# Patient Record
Sex: FEMALE | Race: WHITE | NOT HISPANIC OR LATINO | Employment: FULL TIME | URBAN - METROPOLITAN AREA
[De-identification: names, ages, dates, MRNs, and addresses within clinical notes are randomized per-mention and may not be internally consistent; named-entity substitution may affect disease eponyms.]

---

## 2017-03-29 ENCOUNTER — OFFICE VISIT (OUTPATIENT)
Dept: URGENT CARE | Facility: PHYSICIAN GROUP | Age: 37
End: 2017-03-29
Payer: COMMERCIAL

## 2017-03-29 VITALS
HEIGHT: 62 IN | BODY MASS INDEX: 31.65 KG/M2 | RESPIRATION RATE: 16 BRPM | TEMPERATURE: 98.4 F | HEART RATE: 91 BPM | WEIGHT: 172 LBS | DIASTOLIC BLOOD PRESSURE: 86 MMHG | OXYGEN SATURATION: 95 % | SYSTOLIC BLOOD PRESSURE: 124 MMHG

## 2017-03-29 DIAGNOSIS — J01.91 ACUTE RECURRENT SINUSITIS, UNSPECIFIED LOCATION: ICD-10-CM

## 2017-03-29 DIAGNOSIS — R59.0 LYMPHADENOPATHY OF RIGHT CERVICAL REGION: ICD-10-CM

## 2017-03-29 PROCEDURE — 99214 OFFICE O/P EST MOD 30 MIN: CPT | Performed by: FAMILY MEDICINE

## 2017-03-29 RX ORDER — LEVOFLOXACIN 500 MG/1
500 TABLET, FILM COATED ORAL DAILY
Qty: 10 TAB | Refills: 0 | Status: SHIPPED | OUTPATIENT
Start: 2017-03-29 | End: 2017-04-08

## 2017-03-29 NOTE — PROGRESS NOTES
HPI: Sarah Orozco is a 36 y.o. female who presents with   Chief Complaint   Patient presents with   • Cough     R. ear pain, nasal and chest congestion, headache, swollen glands, sore throat X 4 months       Patient presents to urgent care with continued frontal sinus region headaches nasal congestion postnasal drip right ear pain that has been worsened over the past several days. She's had a sore throat she states that she's had these symptoms on and off for the past 4 months has been treated 2 times with antibiotics but feels that the symptoms have not completely resolved. She has seen an ENT several years ago who had told her she is very narrow sinus passages. She states in the past she has used some Flonase with some improvement in symptoms but not recently has she tried this. She did try to do saline nasal rinse yesterday and feels that she has developed some ear pain has worsened Denies any significant cough or shortness of breath. Patient does have a history of asthma no nausea vomiting or diarrhea. She has had some chills and subjective fevers  Worsened by: activity, laying supine at night, first thing in the morning, when exposed to outside allergens  Improved by: OTC symptomatic medictions      PMH:  has a past medical history of Asthma.  MEDS:   Current outpatient prescriptions:   •  Ibuprofen (ADVIL PO), Take  by mouth., Disp: , Rfl:   •  albuterol 108 (90 BASE) MCG/ACT Aero Soln inhalation aerosol, Inhale 2 Puffs by mouth every 6 hours as needed for Shortness of Breath., Disp: , Rfl:   •  Hydrocod Polst-CPM Polst ER (TUSSIONEX) 10-8 MG/5ML Suspension Extended Release, Take 5 mL by mouth every 12 hours., Disp: 140 mL, Rfl: 0  •  azithromycin (ZITHROMAX) 250 MG Tab, Take as directed, Disp: 6 Tab, Rfl: 0  ALLERGIES: No Known Allergies  SURGHX: No past surgical history on file.  SOCHX:  reports that she has never smoked. She has never used smokeless tobacco.  FH: Family history was reviewed, no  "pertinent findings to report    PE:  Vitals /86 mmHg  Pulse 91  Temp(Src) 36.9 °C (98.4 °F)  Resp 16  Ht 1.575 m (5' 2\")  Wt 78.019 kg (172 lb)  BMI 31.45 kg/m2  SpO2 95%   Gen AOx4, NAD  HEENT: moist mucus membranes,  pain and pressure with percussion of bilateral maxillary sinuses.  Bilateral conjunciva clear without erythema or exudate,  Right TM with mild fluid, left TM  clear without bulge, fluid or loss of landmarks, mild pharyngeal erythema without tonsillar exudate but with bilateral tonsillar enlargement present  Neck: supple, right anterior cervical lymphadenopathy is present, no signs of menigismus  CV/PULM: RRR no murmurs, no rales ronchi or wheezes, no signs of resp distress  Abd soft nontender, bs present  Skin no rashes  Extremities -c/c/e  Neuro appropriate affect,       A/P  1. Acute recurrent sinusitis, unspecified location  levofloxacin (LEVAQUIN) 500 MG tablet    REFERRAL TO ENT   2. Lymphadenopathy of right cervical region       Follow-up with primary care provider within 4-5 days, emergency room precautions discussed.  Patient and/or family appears understanding of information.  Recommend to use flonase daily for 3wks (denies h/o epistaxis)    "

## 2017-03-29 NOTE — MR AVS SNAPSHOT
"        Sarah Mi Ernesto   3/29/2017 8:40 AM   Office Visit   MRN: [id number]    Department:  [facility]   Dept Phone:  [phone]    Description:  Female : 1980   Provider:  Jannette Pisano D.O.           Reason for Visit     Cough R. ear pain, nasal and chest congestion, headache, swollen glands, sore throat X 4 months       Allergies as of 3/29/2017     No Known Allergies      Vital Signs     Blood Pressure Pulse Temperature Respirations Height Weight    124/86 mmHg 91 36.9 °C (98.4 °F) 16 1.575 m (5' 2\") 78.019 kg (172 lb)    Body Mass Index Oxygen Saturation Smoking Status             31.45 kg/m2 95% Never Smoker          Basic Information     Date Of Birth Sex Race Ethnicity Preferred Language    1980 Female White Unknown English      Health Maintenance        Date Due Completion Dates    IMM DTaP/Tdap/Td Vaccine (1 - Tdap) 2000 ---    PAP SMEAR 2002 ---    IMM INFLUENZA (1) 2016 10/20/2015            Current Immunizations     Influenza Vaccine Quad Inj (Pf) 10/20/2015  6:38 AM      Below and/or attached are the medications your provider expects you to take. Review all of your home medications and newly ordered medications with your provider and/or pharmacist. Follow medication instructions as directed by your provider and/or pharmacist. Please keep your medication list with you and share with your provider. Update the information when medications are discontinued, doses are changed, or new medications (including over-the-counter products) are added; and carry medication information at all times in the event of emergency situations     Allergies:  No Known Allergies          Medications  Valid as of: 2017 -  9:01 AM    Generic Name Brand Name Tablet Size Instructions for use    Albuterol Sulfate (Aero Soln) albuterol 108 (90 BASE) MCG/ACT Inhale 2 Puffs by mouth every 6 hours as needed for Shortness of Breath.        Azithromycin (Tab) ZITHROMAX 250 MG Take as " directed        Hydrocod Polst-Chlorphen Polst (Suspension Extended Release) TUSSIONEX 10-8 MG/5ML Take 5 mL by mouth every 12 hours.        Ibuprofen   Take  by mouth.        .                 Medicines prescribed today were sent to:     [facility]/PHARMACY #[id number] [address] [address]    Phone: [phone] Fax: [phone]    Open 24 Hours?: No      Medication refill instructions:       If your prescription bottle indicates you have medication refills left, it is not necessary to call your provider’s office. Please contact your pharmacy and they will refill your medication.    If your prescription bottle indicates you do not have any refills left, you may request refills at any time through one of the following ways: The online [employer] system (except Urgent Care), by calling your provider’s office, or by asking your pharmacy to contact your provider’s office with a refill request. Medication refills are processed only during regular business hours and may not be available until the next business day. Your provider may request additional information or to have a follow-up visit with you prior to refilling your medication.   *Please Note: Medication refills are assigned a new Rx number when refilled electronically. Your pharmacy may indicate that no refills were authorized even though a new prescription for the same medication is available at the pharmacy. Please request the medicine by name with the pharmacy before contacting your provider for a refill.           [employer] Access Code: Activation code not generated  Current [employer] Status: Active

## 2017-05-29 ENCOUNTER — OFFICE VISIT (OUTPATIENT)
Dept: URGENT CARE | Facility: PHYSICIAN GROUP | Age: 37
End: 2017-05-29
Payer: COMMERCIAL

## 2017-05-29 VITALS
WEIGHT: 168 LBS | DIASTOLIC BLOOD PRESSURE: 72 MMHG | OXYGEN SATURATION: 96 % | SYSTOLIC BLOOD PRESSURE: 118 MMHG | HEART RATE: 91 BPM | TEMPERATURE: 97.4 F | RESPIRATION RATE: 16 BRPM | BODY MASS INDEX: 30.91 KG/M2 | HEIGHT: 62 IN

## 2017-05-29 DIAGNOSIS — L03.311 CELLULITIS OF ABDOMINAL WALL: ICD-10-CM

## 2017-05-29 DIAGNOSIS — B37.2 CANDIDAL DERMATITIS: ICD-10-CM

## 2017-05-29 PROCEDURE — 99214 OFFICE O/P EST MOD 30 MIN: CPT | Performed by: NURSE PRACTITIONER

## 2017-05-29 RX ORDER — FLUCONAZOLE 150 MG/1
150 TABLET ORAL DAILY
Qty: 2 TAB | Refills: 0 | Status: SHIPPED | OUTPATIENT
Start: 2017-05-29 | End: 2019-08-28

## 2017-05-29 RX ORDER — DOXYCYCLINE HYCLATE 100 MG
100 TABLET ORAL 2 TIMES DAILY
Qty: 14 TAB | Refills: 0 | Status: SHIPPED | OUTPATIENT
Start: 2017-05-29 | End: 2017-06-05

## 2017-05-29 ASSESSMENT — ENCOUNTER SYMPTOMS: FEVER: 0

## 2017-05-29 NOTE — PROGRESS NOTES
"Subjective:      Sarah Orozco is a 37 y.o. female who presents with Rash            Rash  This is a new problem. Episode onset: one week ago noticed small bumps over her trunk, then one area of bumps became larger, red and hard. Today it is really painful. The problem has been rapidly worsening since onset. The affected locations include the abdomen. The rash is characterized by redness, pain and swelling. Associated with: she is fostering kittens and is not sure if this is because they possibly scratched her and it got infected. Pertinent negatives include no fever. (She also has a significant yeast infection under both her breasts) Treatments tried: warm compresses. The treatment provided no relief.       Review of Systems   Constitutional: Negative for fever.   Skin: Positive for rash.   All other systems reviewed and are negative.    Past Medical History   Diagnosis Date   • Asthma     No past surgical history on file.   Social History     Social History   • Marital Status: Unknown     Spouse Name: N/A   • Number of Children: N/A   • Years of Education: N/A     Occupational History   • Not on file.     Social History Main Topics   • Smoking status: Never Smoker    • Smokeless tobacco: Never Used   • Alcohol Use: Not on file   • Drug Use: Not on file   • Sexual Activity: Not on file     Other Topics Concern   • Not on file     Social History Narrative          Objective:     /72 mmHg  Pulse 91  Temp(Src) 36.3 °C (97.4 °F)  Resp 16  Ht 1.575 m (5' 2\")  Wt 76.204 kg (168 lb)  BMI 30.72 kg/m2  SpO2 96%     Physical Exam   Constitutional: She is oriented to person, place, and time. Vital signs are normal. She appears well-developed and well-nourished.   HENT:   Head: Normocephalic and atraumatic.   Eyes: EOM are normal. Pupils are equal, round, and reactive to light.   Neck: Normal range of motion.   Cardiovascular: Normal rate and regular rhythm.    Pulmonary/Chest: Effort normal. "       Musculoskeletal: Normal range of motion.   Neurological: She is alert and oriented to person, place, and time.   Skin: Skin is warm, dry and intact.        Psychiatric: She has a normal mood and affect. Her speech is normal and behavior is normal. Thought content normal.   Vitals reviewed.              Assessment/Plan:     1. Cellulitis of abdominal wall  - doxycycline (VIBRAMYCIN) 100 MG Tab; Take 1 Tab by mouth 2 times a day for 7 days.  Dispense: 14 Tab; Refill: 0    2. Candidal dermatitis  - miconazole (MICOTIN) 2 % Cream; Apply 1 Application to affected area(s) 2 times a day.  Dispense: 1 Tube; Refill: 0  - fluconazole (DIFLUCAN) 150 MG tablet; Take 1 Tab by mouth every day.  Dispense: 2 Tab; Refill: 0    Warm compresses to area of cellulitis  Apply polysporin daily  Keep skin under breasts clean and dry, free of moisture as much as possible  Instructed to take diflucan when she finishes ABX  Supportive care, differential diagnoses, and indications for immediate follow-up discussed with patient.    Pathogenesis of diagnosis discussed including typical length and natural progression.      Instructed to return to  or nearest emergency department if symptoms fail to improve, for any change in condition, further concerns, or new concerning symptoms.  Patient states understanding of the plan of care and discharge instructions.

## 2017-05-29 NOTE — MR AVS SNAPSHOT
"        Sarah Mi Ernesto   2017 9:05 AM   Office Visit   MRN: [id number]    Department:  [facility]   Dept Phone:  [phone]    Description:  Female : 1980   Provider:  ALYSON Bean           Reason for Visit     Rash Red, swollen and painful rash on abdomen x 3 days.       Allergies as of 2017     No Known Allergies      You were diagnosed with     Cellulitis of abdominal wall   [519536]       Candidal dermatitis   [[id number]]         Vital Signs     Blood Pressure Pulse Temperature Respirations Height Weight    118/72 mmHg 91 36.3 °C (97.4 °F) 16 1.575 m (5' 2\") 76.204 kg (168 lb)    Body Mass Index Oxygen Saturation Smoking Status             30.72 kg/m2 96% Never Smoker          Basic Information     Date Of Birth Sex Race Ethnicity Preferred Language    1980 Female White Unknown English      Health Maintenance        Date Due Completion Dates    IMM DTaP/Tdap/Td Vaccine (1 - Tdap) 2000 ---    PAP SMEAR 2002 ---            Current Immunizations     Influenza Vaccine Quad Inj (Pf) 10/20/2015  6:38 AM      Below and/or attached are the medications your provider expects you to take. Review all of your home medications and newly ordered medications with your provider and/or pharmacist. Follow medication instructions as directed by your provider and/or pharmacist. Please keep your medication list with you and share with your provider. Update the information when medications are discontinued, doses are changed, or new medications (including over-the-counter products) are added; and carry medication information at all times in the event of emergency situations     Allergies:  No Known Allergies          Medications  Valid as of: May 29, 2017 - 10:19 AM    Generic Name Brand Name Tablet Size Instructions for use    Albuterol Sulfate (Aero Soln) albuterol 108 (90 BASE) MCG/ACT Inhale 2 Puffs by mouth every 6 hours as needed for Shortness of Breath.       " Azithromycin (Tab) ZITHROMAX 250 MG Take as directed        Doxycycline Hyclate (Tab) VIBRAMYCIN 100 MG Take 1 Tab by mouth 2 times a day for 7 days.        Fluconazole (Tab) DIFLUCAN 150 MG Take 1 Tab by mouth every day.        Fluticasone Propionate   Spray  in nose.        Hydrocod Polst-Chlorphen Polst (Suspension Extended Release) TUSSIONEX 10-8 MG/5ML Take 5 mL by mouth every 12 hours.        Ibuprofen   Take  by mouth.        Loratadine   Take  by mouth.        Miconazole Nitrate (Cream) MICOTIN 2 % Apply 1 Application to affected area(s) 2 times a day.        .                 Medicines prescribed today were sent to:     [facility]/PHARMACY #[id number] - [place], [place] - [address]    [address] [place] [address]    Phone: [phone] Fax: [phone]    Open 24 Hours?: No      Medication refill instructions:       If your prescription bottle indicates you have medication refills left, it is not necessary to call your provider’s office. Please contact your pharmacy and they will refill your medication.    If your prescription bottle indicates you do not have any refills left, you may request refills at any time through one of the following ways: The online [employer] system (except Urgent Care), by calling your provider’s office, or by asking your pharmacy to contact your provider’s office with a refill request. Medication refills are processed only during regular business hours and may not be available until the next business day. Your provider may request additional information or to have a follow-up visit with you prior to refilling your medication.   *Please Note: Medication refills are assigned a new Rx number when refilled electronically. Your pharmacy may indicate that no refills were authorized even though a new prescription for the same medication is available at the pharmacy. Please request the medicine by name with the pharmacy before contacting your provider for a refill.           [employer] Access Code: Activation  code not generated  Current MyChart Status: Active

## 2018-01-14 ENCOUNTER — OFFICE VISIT (OUTPATIENT)
Dept: URGENT CARE | Facility: PHYSICIAN GROUP | Age: 38
End: 2018-01-14
Payer: COMMERCIAL

## 2018-01-14 VITALS
HEIGHT: 62 IN | DIASTOLIC BLOOD PRESSURE: 70 MMHG | HEART RATE: 104 BPM | SYSTOLIC BLOOD PRESSURE: 130 MMHG | OXYGEN SATURATION: 95 % | WEIGHT: 180.2 LBS | TEMPERATURE: 98.3 F | BODY MASS INDEX: 33.16 KG/M2

## 2018-01-14 DIAGNOSIS — L03.115 CELLULITIS OF RIGHT LOWER EXTREMITY: ICD-10-CM

## 2018-01-14 PROCEDURE — 99214 OFFICE O/P EST MOD 30 MIN: CPT | Performed by: NURSE PRACTITIONER

## 2018-01-14 RX ORDER — CLINDAMYCIN HYDROCHLORIDE 300 MG/1
300 CAPSULE ORAL 3 TIMES DAILY
Qty: 21 CAP | Refills: 0 | Status: SHIPPED | OUTPATIENT
Start: 2018-01-14 | End: 2018-01-21

## 2018-01-14 ASSESSMENT — PAIN SCALES - GENERAL: PAINLEVEL: 8=MODERATE-SEVERE PAIN

## 2018-01-14 NOTE — PROGRESS NOTES
"Subjective:      Sarah Orozco is a 37 y.o. female who presents with Leg Problem (R leg Swollen/bleeding, x4 days )            This is a new problem. Pt reports she has been in [place] for one week on vacation. Four days ago, during vacation, she noticed a painful lump develop on the inside of her right thigh. Over the past few days the pain, swelling and redness have become much worse. It is almost too painful to walk at this point. She admits the bump bleeds sometimes but denies any other color of fluid. She has tried warm compresses and antibiotic ointment w/o relief.        Review of Systems   Musculoskeletal:        Infection right thigh   All other systems reviewed and are negative.    Past Medical History:   Diagnosis Date   • Asthma     History reviewed. No pertinent surgical history.   Social History     Social History   • Marital status: Unknown     Spouse name: N/A   • Number of children: N/A   • Years of education: N/A     Occupational History   • Not on file.     Social History Main Topics   • Smoking status: Never Smoker   • Smokeless tobacco: Never Used   • Alcohol use No   • Drug use: No   • Sexual activity: Not on file     Other Topics Concern   • Not on file     Social History Narrative   • No narrative on file          Objective:     /70   Pulse (!) 104   Temp 36.8 °C (98.3 °F)   Ht 1.575 m (5' 2\")   Wt 81.7 kg (180 lb 3.2 oz)   SpO2 95%   BMI 32.96 kg/m²      Physical Exam   Constitutional: She is oriented to person, place, and time. Vital signs are normal. She appears well-developed and well-nourished.   HENT:   Head: Normocephalic and atraumatic.   Eyes: EOM are normal. Pupils are equal, round, and reactive to light.   Neck: Normal range of motion.   Cardiovascular: Normal rate and regular rhythm.    Pulmonary/Chest: Effort normal.   Musculoskeletal: Normal range of motion.        Right upper leg: She exhibits tenderness.        Legs:  Neurological: She is alert and oriented to " person, place, and time.   Skin: Skin is warm and dry. Capillary refill takes less than 2 seconds.   Psychiatric: She has a normal mood and affect. Her speech is normal and behavior is normal. Thought content normal.   Vitals reviewed.              Assessment/Plan:     1. Cellulitis of right lower extremity  - clindamycin (CLEOCIN) 300 MG Cap; Take 1 Cap by mouth 3 times a day for 7 days.  Dispense: 21 Cap; Refill: 0    Origin of abscess discussed with patient includes but is not limited to insect bite, ingrown hair follicle, sebaceous cyst  May continue warm compresses as needed  No need to apply antibiotic ointment  Tylenol and Ibuprofen as needed for pain  RTC in 3-4 days if area becomes localized and needs to be drained or if she does not see much improvement at all  Strict ER precautions for development of fever, body aches, or worsening redness and swelling  Supportive care, differential diagnoses, and indications for immediate follow-up discussed with patient.    Pathogenesis of diagnosis discussed including typical length and natural progression.      Instructed to return to  or nearest emergency department if symptoms fail to improve, for any change in condition, further concerns, or new concerning symptoms.  Patient states understanding of the plan of care and discharge instructions.

## 2018-06-01 ENCOUNTER — HOSPITAL ENCOUNTER (OUTPATIENT)
Dept: LAB | Facility: MEDICAL CENTER | Age: 38
End: 2018-06-01
Attending: FAMILY MEDICINE
Payer: COMMERCIAL

## 2018-06-01 LAB
25(OH)D3 SERPL-MCNC: 26 NG/ML (ref 30–100)
ALBUMIN SERPL BCP-MCNC: 4.1 G/DL (ref 3.2–4.9)
ALBUMIN/GLOB SERPL: 1.4 G/DL
ALP SERPL-CCNC: 38 U/L (ref 30–99)
ALT SERPL-CCNC: 12 U/L (ref 2–50)
ANION GAP SERPL CALC-SCNC: 6 MMOL/L (ref 0–11.9)
APPEARANCE UR: ABNORMAL
AST SERPL-CCNC: 16 U/L (ref 12–45)
BACTERIA #/AREA URNS HPF: NEGATIVE /HPF
BASOPHILS # BLD AUTO: 0.7 % (ref 0–1.8)
BASOPHILS # BLD: 0.03 K/UL (ref 0–0.12)
BILIRUB SERPL-MCNC: 0.5 MG/DL (ref 0.1–1.5)
BILIRUB UR QL STRIP.AUTO: NEGATIVE
BUN SERPL-MCNC: 10 MG/DL (ref 8–22)
CALCIUM SERPL-MCNC: 8.7 MG/DL (ref 8.5–10.5)
CHLORIDE SERPL-SCNC: 105 MMOL/L (ref 96–112)
CHOLEST SERPL-MCNC: 134 MG/DL (ref 100–199)
CO2 SERPL-SCNC: 24 MMOL/L (ref 20–33)
COLOR UR: YELLOW
CREAT SERPL-MCNC: 0.71 MG/DL (ref 0.5–1.4)
EOSINOPHIL # BLD AUTO: 0.07 K/UL (ref 0–0.51)
EOSINOPHIL NFR BLD: 1.5 % (ref 0–6.9)
EPI CELLS #/AREA URNS HPF: ABNORMAL /HPF
ERYTHROCYTE [DISTWIDTH] IN BLOOD BY AUTOMATED COUNT: 40.2 FL (ref 35.9–50)
EST. AVERAGE GLUCOSE BLD GHB EST-MCNC: 103 MG/DL
FERRITIN SERPL-MCNC: 95.9 NG/ML (ref 10–291)
FOLATE SERPL-MCNC: 19 NG/ML
GLOBULIN SER CALC-MCNC: 3 G/DL (ref 1.9–3.5)
GLUCOSE SERPL-MCNC: 89 MG/DL (ref 65–99)
GLUCOSE UR STRIP.AUTO-MCNC: NEGATIVE MG/DL
HBA1C MFR BLD: 5.2 % (ref 0–5.6)
HCT VFR BLD AUTO: 42 % (ref 37–47)
HDLC SERPL-MCNC: 50 MG/DL
HGB BLD-MCNC: 14.3 G/DL (ref 12–16)
HYALINE CASTS #/AREA URNS LPF: ABNORMAL /LPF
IMM GRANULOCYTES # BLD AUTO: 0.01 K/UL (ref 0–0.11)
IMM GRANULOCYTES NFR BLD AUTO: 0.2 % (ref 0–0.9)
IRON SATN MFR SERPL: 39 % (ref 15–55)
IRON SERPL-MCNC: 151 UG/DL (ref 40–170)
KETONES UR STRIP.AUTO-MCNC: NEGATIVE MG/DL
LDLC SERPL CALC-MCNC: 64 MG/DL
LEUKOCYTE ESTERASE UR QL STRIP.AUTO: ABNORMAL
LYMPHOCYTES # BLD AUTO: 1.73 K/UL (ref 1–4.8)
LYMPHOCYTES NFR BLD: 38 % (ref 22–41)
MCH RBC QN AUTO: 30.6 PG (ref 27–33)
MCHC RBC AUTO-ENTMCNC: 34 G/DL (ref 33.6–35)
MCV RBC AUTO: 89.9 FL (ref 81.4–97.8)
MICRO URNS: ABNORMAL
MONOCYTES # BLD AUTO: 0.32 K/UL (ref 0–0.85)
MONOCYTES NFR BLD AUTO: 7 % (ref 0–13.4)
NEUTROPHILS # BLD AUTO: 2.39 K/UL (ref 2–7.15)
NEUTROPHILS NFR BLD: 52.6 % (ref 44–72)
NITRITE UR QL STRIP.AUTO: NEGATIVE
NRBC # BLD AUTO: 0 K/UL
NRBC BLD-RTO: 0 /100 WBC
PH UR STRIP.AUTO: 6 [PH]
PLATELET # BLD AUTO: 287 K/UL (ref 164–446)
PMV BLD AUTO: 10.7 FL (ref 9–12.9)
POTASSIUM SERPL-SCNC: 4.3 MMOL/L (ref 3.6–5.5)
PROT SERPL-MCNC: 7.1 G/DL (ref 6–8.2)
PROT UR QL STRIP: 30 MG/DL
RBC # BLD AUTO: 4.67 M/UL (ref 4.2–5.4)
RBC # URNS HPF: >150 /HPF
RBC UR QL AUTO: ABNORMAL
SODIUM SERPL-SCNC: 135 MMOL/L (ref 135–145)
SP GR UR STRIP.AUTO: 1.02
TIBC SERPL-MCNC: 389 UG/DL (ref 250–450)
TRIGL SERPL-MCNC: 101 MG/DL (ref 0–149)
TSH SERPL DL<=0.005 MIU/L-ACNC: 0.87 UIU/ML (ref 0.38–5.33)
UROBILINOGEN UR STRIP.AUTO-MCNC: 0.2 MG/DL
VIT B12 SERPL-MCNC: 317 PG/ML (ref 211–911)
WBC # BLD AUTO: 4.6 K/UL (ref 4.8–10.8)
WBC #/AREA URNS HPF: ABNORMAL /HPF

## 2018-06-01 PROCEDURE — 82728 ASSAY OF FERRITIN: CPT

## 2018-06-01 PROCEDURE — 80061 LIPID PANEL: CPT

## 2018-06-01 PROCEDURE — 83540 ASSAY OF IRON: CPT

## 2018-06-01 PROCEDURE — 80053 COMPREHEN METABOLIC PANEL: CPT

## 2018-06-01 PROCEDURE — 83036 HEMOGLOBIN GLYCOSYLATED A1C: CPT

## 2018-06-01 PROCEDURE — 82746 ASSAY OF FOLIC ACID SERUM: CPT

## 2018-06-01 PROCEDURE — 85025 COMPLETE CBC W/AUTO DIFF WBC: CPT

## 2018-06-01 PROCEDURE — 87086 URINE CULTURE/COLONY COUNT: CPT

## 2018-06-01 PROCEDURE — 83550 IRON BINDING TEST: CPT

## 2018-06-01 PROCEDURE — 82607 VITAMIN B-12: CPT

## 2018-06-01 PROCEDURE — 84443 ASSAY THYROID STIM HORMONE: CPT

## 2018-06-01 PROCEDURE — 81001 URINALYSIS AUTO W/SCOPE: CPT

## 2018-06-01 PROCEDURE — 82306 VITAMIN D 25 HYDROXY: CPT

## 2018-06-01 PROCEDURE — 36415 COLL VENOUS BLD VENIPUNCTURE: CPT

## 2018-06-03 LAB
BACTERIA UR CULT: NORMAL
SIGNIFICANT IND 70042: NORMAL
SITE SITE: NORMAL
SOURCE SOURCE: NORMAL

## 2018-06-27 ENCOUNTER — HOSPITAL ENCOUNTER (OUTPATIENT)
Dept: LAB | Facility: MEDICAL CENTER | Age: 38
End: 2018-06-27
Attending: FAMILY MEDICINE
Payer: COMMERCIAL

## 2018-06-27 PROCEDURE — 81001 URINALYSIS AUTO W/SCOPE: CPT

## 2018-06-28 LAB
APPEARANCE UR: ABNORMAL
BACTERIA #/AREA URNS HPF: ABNORMAL /HPF
BILIRUB UR QL STRIP.AUTO: NEGATIVE
COLOR UR: YELLOW
EPI CELLS #/AREA URNS HPF: ABNORMAL /HPF
GLUCOSE UR STRIP.AUTO-MCNC: NEGATIVE MG/DL
KETONES UR STRIP.AUTO-MCNC: NEGATIVE MG/DL
LEUKOCYTE ESTERASE UR QL STRIP.AUTO: ABNORMAL
MICRO URNS: ABNORMAL
NITRITE UR QL STRIP.AUTO: NEGATIVE
PH UR STRIP.AUTO: 6.5 [PH]
PROT UR QL STRIP: NEGATIVE MG/DL
RBC # URNS HPF: ABNORMAL /HPF
RBC UR QL AUTO: ABNORMAL
SP GR UR STRIP.AUTO: 1.01
UROBILINOGEN UR STRIP.AUTO-MCNC: 0.2 MG/DL
WBC #/AREA URNS HPF: ABNORMAL /HPF

## 2019-05-26 ENCOUNTER — OFFICE VISIT (OUTPATIENT)
Dept: URGENT CARE | Facility: MEDICAL CENTER | Age: 39
End: 2019-05-26
Payer: COMMERCIAL

## 2019-05-26 VITALS
TEMPERATURE: 96.9 F | DIASTOLIC BLOOD PRESSURE: 90 MMHG | OXYGEN SATURATION: 96 % | BODY MASS INDEX: 32.5 KG/M2 | WEIGHT: 176.6 LBS | HEART RATE: 107 BPM | RESPIRATION RATE: 16 BRPM | SYSTOLIC BLOOD PRESSURE: 122 MMHG | HEIGHT: 62 IN

## 2019-05-26 DIAGNOSIS — K64.5 THROMBOSED EXTERNAL HEMORRHOIDS: ICD-10-CM

## 2019-05-26 PROCEDURE — 99999 PR NO CHARGE: CPT | Performed by: PHYSICIAN ASSISTANT

## 2019-05-26 PROCEDURE — 46083 INC THROMBOSED HROID XTRNL: CPT | Performed by: PHYSICIAN ASSISTANT

## 2019-05-26 ASSESSMENT — ENCOUNTER SYMPTOMS
CHANGE IN BOWEL HABIT: 1
ROS GI COMMENTS: EXTERNAL HEMORRHOIDS
FEVER: 0
BLOOD IN STOOL: 0
DIARRHEA: 0
DIZZINESS: 0
HEADACHES: 0
VOMITING: 0
SHORTNESS OF BREATH: 0
PALPITATIONS: 0
NAUSEA: 0
ABDOMINAL PAIN: 0
BRUISES/BLEEDS EASILY: 0
CHILLS: 0
MYALGIAS: 0
CONSTIPATION: 1
SORE THROAT: 0
COUGH: 0

## 2019-05-27 NOTE — PROGRESS NOTES
"Subjective:      Sarah Orozco is a 39 y.o. female who presents with Hemorrhoids (severe pain, since patient had son, it got worse on Thursday, has taken over the counter \"prolax\" but has not worked.)      Hemorrhoids   This is a new (She states that she first got hemorrhoids back in 2009 after the birth of her son she has had them since that time but they never bother her) problem. The current episode started in the past 7 days (She started to notice the becoming painful 3 to 4 days ago). The problem occurs constantly. The problem has been rapidly worsening. Associated symptoms include a change in bowel habit (Patient reports that she has been constipated for the past 2 months she is to few days ago she had a very large bowel movement and that of the hemorrhoid seemed to get worse.). Pertinent negatives include no abdominal pain, chest pain, chills, congestion, coughing, fever, headaches, myalgias, nausea, rash, sore throat, urinary symptoms or vomiting. Exacerbated by: Sitting and having bowel movement make it worse.  She said last night she was even having trouble sleeping. Treatments tried: She is tried Preparation H, coconut oil, sitz bath's. Improvement on treatment: She said the sitz baths initially provided some relief but have not been helping recently.       Review of Systems   Constitutional: Negative for chills and fever.   HENT: Negative for congestion and sore throat.    Respiratory: Negative for cough and shortness of breath.    Cardiovascular: Negative for chest pain and palpitations.   Gastrointestinal: Positive for change in bowel habit (Patient reports that she has been constipated for the past 2 months she is to few days ago she had a very large bowel movement and that of the hemorrhoid seemed to get worse.), constipation and hemorrhoids. Negative for abdominal pain, blood in stool, diarrhea, nausea and vomiting.        External hemorrhoids   Genitourinary: Negative for dysuria. " "  Musculoskeletal: Negative for myalgias.   Skin: Negative for rash.   Neurological: Negative for dizziness and headaches.   Endo/Heme/Allergies: Does not bruise/bleed easily.       PMH:  has a past medical history of Asthma.  MEDS:   Current Outpatient Prescriptions:   •  albuterol 108 (90 BASE) MCG/ACT Aero Soln inhalation aerosol, Inhale 2 Puffs by mouth every 6 hours as needed for Shortness of Breath., Disp: , Rfl:   •  Fluticasone Propionate (FLONASE NA), Spray  in nose., Disp: , Rfl:   •  Loratadine (CLARITIN PO), Take  by mouth., Disp: , Rfl:   •  miconazole (MICOTIN) 2 % Cream, Apply 1 Application to affected area(s) 2 times a day. (Patient not taking: Reported on 5/26/2019), Disp: 1 Tube, Rfl: 0  •  fluconazole (DIFLUCAN) 150 MG tablet, Take 1 Tab by mouth every day. (Patient not taking: Reported on 5/26/2019), Disp: 2 Tab, Rfl: 0  •  Ibuprofen (ADVIL PO), Take  by mouth., Disp: , Rfl:   •  Hydrocod Polst-CPM Polst ER (TUSSIONEX) 10-8 MG/5ML Suspension Extended Release, Take 5 mL by mouth every 12 hours. (Patient not taking: Reported on 5/26/2019), Disp: 140 mL, Rfl: 0  •  azithromycin (ZITHROMAX) 250 MG Tab, Take as directed (Patient not taking: Reported on 5/26/2019), Disp: 6 Tab, Rfl: 0  ALLERGIES: No Known Allergies  SURGHX: No past surgical history on file.  SOCHX:  reports that she has never smoked. She has never used smokeless tobacco. She reports that she does not drink alcohol or use drugs.  FH: Family history was reviewed, no pertinent findings to report     Objective:     /90 (BP Location: Left arm, Patient Position: Sitting, BP Cuff Size: Adult)   Pulse (!) 107   Temp 36.1 °C (96.9 °F) (Temporal)   Resp 16   Ht 1.575 m (5' 2\")   Wt 80.1 kg (176 lb 9.6 oz)   SpO2 96%   BMI 32.30 kg/m²      Physical Exam   Constitutional: She is oriented to person, place, and time. She appears well-developed and well-nourished.   HENT:   Head: Normocephalic and atraumatic.   Right Ear: External ear " normal.   Left Ear: External ear normal.   Eyes: Pupils are equal, round, and reactive to light. Conjunctivae are normal.   Cardiovascular: Normal rate and regular rhythm.    No murmur heard.  Pulmonary/Chest: Effort normal and breath sounds normal.   Abdominal: Soft. Normal appearance. There is tenderness in the suprapubic area. There is no CVA tenderness.   Genitourinary: Rectal exam shows external hemorrhoid (Very large thrombosed external hemorrhoid) and tenderness. Pelvic exam was performed with patient prone.   Neurological: She is alert and oriented to person, place, and time.   Skin: Skin is warm and dry. Capillary refill takes less than 2 seconds.   Psychiatric: She has a normal mood and affect. Her behavior is normal. Judgment normal.        Assessment/Plan:     1. Thrombosed external hemorrhoids  - Incision and Drainage  - REFERRAL TO COLORECTAL SURGERY  - Used an 11 blade scalpel to make a very small incision in the thrombosed hemorrhoid.  Small clot and copious amount of blood was released.  The hemorrhoid shrunk in size by approximately half to three quarters.  Patient tolerated the procedure well.  Afterwards she reported feeling some mild improvement in her pain.  -Advised her to continue doing the sitz bath's multiple times per day and we will have her follow-up with the colorectal surgeon to discuss permanent removal options.  Advised that if it should get worse again prior to her being able to see the surgeon she should go to the emergency room for further evaluation      Differential Diagnosis, natural history, and supportive care discussed. Return to the Urgent Care or follow up with your PCP if symptoms fail to resolve, or for any new or worsening symptoms. Emergency room precautions discussed. Patient and/or family appears understanding of information.

## 2019-08-28 ENCOUNTER — OFFICE VISIT (OUTPATIENT)
Dept: CARDIOLOGY | Facility: MEDICAL CENTER | Age: 39
End: 2019-08-28
Payer: COMMERCIAL

## 2019-08-28 VITALS
OXYGEN SATURATION: 96 % | HEART RATE: 70 BPM | WEIGHT: 171 LBS | SYSTOLIC BLOOD PRESSURE: 118 MMHG | DIASTOLIC BLOOD PRESSURE: 88 MMHG | BODY MASS INDEX: 31.47 KG/M2 | HEIGHT: 62 IN

## 2019-08-28 DIAGNOSIS — R00.2 PALPITATIONS: ICD-10-CM

## 2019-08-28 DIAGNOSIS — Z82.49 FAMILY HISTORY OF HEART DISEASE: ICD-10-CM

## 2019-08-28 PROCEDURE — 99203 OFFICE O/P NEW LOW 30 MIN: CPT | Performed by: INTERNAL MEDICINE

## 2019-08-28 RX ORDER — CITALOPRAM 20 MG/1
20 TABLET ORAL DAILY
COMMUNITY
End: 2021-06-15

## 2019-08-28 RX ORDER — CETIRIZINE HYDROCHLORIDE 10 MG/1
10 TABLET ORAL
COMMUNITY
End: 2021-06-15

## 2019-08-28 NOTE — LETTER
[facility]   [address]   [address]  [place] [address]  Phone: [phone]  Fax: [phone]              Sarah Orozco  1980    Encounter Date: 8/28/2019    Dorothy Pavon M.D.          PROGRESS NOTE:  Subjective:   Chief Complaint:   Chief Complaint   Patient presents with   • Palpitations       Sarah Orozco is a 39 y.o. female who is self-referred for family history premature coronary artery disease and palpitations.    Notes her heart racing. Will feel like her heart is racing quickly, lasts only a few seconds, associated with wave of lightheadedness, can happen sitting/resting, not with activity. Only happens once every few weeks.    She is not limited by chest pain, pressure or tightness.   No significant dyspnea on exertion, orthopnea or lower extremity swelling.   Does note some [place] going upstairs at work carrying boxes.  No presyncope/syncope.   No symptoms of leg claudication.   No stroke/TIA like symptoms.  Prior right thigh turning blue, had testing, resolved, only top of thigh.    Has exercise induced asthma, worse in cold weather.  2 years ago she had elevated BP at a health screening, then PCP and OB said it was high, but it was borderline, then it came back down.  No Tobacco, SLE/RA/CKD.  No DM.  Has a murmur, RSB, not new, heard by OB also.    LDL cholesterol 64, HDL 50.  Father has severely elevated cholesterol.  Father had a few ecgs, then stress test, then CABG at 53, his mother had CABG at 49.    Teaches at [employer].  , has 2 kids, 10 (son) and 5 (girl).    DATA REVIEWED by me:  ECG 8/29/2018  Sinus 69    2 view chest x-ray 2005  Normal    Most recent labs:     6/1/2018 hemoglobin 14.3, platelets 287, sodium 135, potassium 4.3, creatinine 0.  7 1, LFTs normal, LDL 64, HDL 50, to glycerides 101, total cholesterol 134    Past Medical History:   Diagnosis Date   •  Asthma      History reviewed. No pertinent surgical history.  Family History   Problem Relation Age of Onset   • Heart Disease Father         Father had CABG at 53, after abnl stress and ECG   • Hyperlipidemia Father    • Heart Disease Paternal Grandmother         CABG at 49,  in her sleep in 70s   • Other Mother         Liver failure after gallbladder surgery, 61     Social History     Socioeconomic History   • Marital status:      Spouse name: Not on file   • Number of children: Not on file   • Years of education: Not on file   • Highest education level: Not on file   Occupational History   • Not on file   Social Needs   • Financial resource strain: Not on file   • Food insecurity:     Worry: Not on file     Inability: Not on file   • Transportation needs:     Medical: Not on file     Non-medical: Not on file   Tobacco Use   • Smoking status: Never Smoker   • Smokeless tobacco: Never Used   Substance and Sexual Activity   • Alcohol use: No   • Drug use: No   • Sexual activity: Not on file   Lifestyle   • Physical activity:     Days per week: Not on file     Minutes per session: Not on file   • Stress: Not on file   Relationships   • Social connections:     Talks on phone: Not on file     Gets together: Not on file     Attends Faith service: Not on file     Active member of club or organization: Not on file     Attends meetings of clubs or organizations: Not on file     Relationship status: Not on file   • Intimate partner violence:     Fear of current or ex partner: Not on file     Emotionally abused: Not on file     Physically abused: Not on file     Forced sexual activity: Not on file   Other Topics Concern   • Not on file   Social History Narrative   • Not on file     No Known Allergies    Current Outpatient Medications   Medication Sig Dispense Refill   • cetirizine (ZYRTEC) 10 MG Tab Take 10 mg by mouth 1 time daily as needed for Allergies.     • citalopram (CELEXA) 20 MG Tab Take 20 mg by  "mouth every day. Indications: Depression     • Fluticasone Propionate (FLONASE NA) Spray  in nose.     • miconazole (MICOTIN) 2 % Cream Apply 1 Application to affected area(s) 2 times a day. 1 Tube 0   • Ibuprofen (ADVIL PO) Take  by mouth.     • albuterol 108 (90 BASE) MCG/ACT Aero Soln inhalation aerosol Inhale 2 Puffs by mouth every 6 hours as needed for Shortness of Breath.       No current facility-administered medications for this visit.        ROS  All others systems reviewed and negative.     Objective:     /88 (BP Location: Left arm, Patient Position: Sitting, BP Cuff Size: Adult)   Pulse 70   Ht 1.575 m (5' 2\")   Wt 77.6 kg (171 lb)   SpO2 96%  Body mass index is 31.28 kg/m².    Physical Exam   General: No acute distress. Well nourished.  HEENT: EOM grossly intact, no scleral icterus, no pharyngeal erythema.   Neck:  No JVD, no bruits, trachea midline  CVS: RRR. Normal S1, S2. 2/6 early peaking sys murmur RSB. No LE edema.  2+ radial pulses, 2+ PT pulses  Resp: CTAB. No wheezing or crackles/rhonchi. Normal respiratory effort.  Abdomen: Soft, NT, no quentin hepatomegaly.  MSK/Ext: No clubbing or cyanosis.  Skin: Warm and dry, no rashes.  Neurological: CN III-XII grossly intact. No focal deficits.   Psych: A&O x 3, appropriate affect, good judgement      Assessment:     1. Family history of heart disease  EKG   2. Palpitations         Medical Decision Making:  Today's Assessment / Status / Plan:     -Her palpitations are short, not concerning at this time  -Low risk for CAD at her age even with FH  -See below    Written instructions given today:    -At this time, I don't have concerns  -We could at any time have a Zio Patch heart monitor placed if the palpitations/heart racing increase in time, frequency or start to bother you  -At some point consider a Calcium Score to look for premature calcified heart disease to help you know your risk but I don't recommend this until at least your late " 40s.        Return if symptoms worsen or fail to improve.    It is my pleasure to participate in the care of Ms. Orozco.  Please do not hesitate to contact me with questions or concerns.    Dorothy Pavon MD, [facility]  Cardiologist [facility]    Please note that this dictation was created using voice recognition software. I have made every reasonable attempt to correct obvious errors, but it is possible there are errors of grammar and possibly content that I did not discover before finalizing the note.      Kavin Bush D.O.  [address] [facility] [address]  VIA Facsimile: [phone]

## 2019-08-28 NOTE — PATIENT INSTRUCTIONS
-At this time, I don't have concerns  -We could at any time have a Zio Patch heart monitor placed if the palpitations/heart racing increase in time, frequency or start to bother you  -At some point consider a Calcium Score to look for premature calcified heart disease to help you know your risk but I don't recommend this until at least your late 40s.

## 2019-08-28 NOTE — PROGRESS NOTES
Subjective:   Chief Complaint:   Chief Complaint   Patient presents with   • Palpitations       Sarah Orozco is a 39 y.o. female who is self-referred for family history premature coronary artery disease and palpitations.    Notes her heart racing. Will feel like her heart is racing quickly, lasts only a few seconds, associated with wave of lightheadedness, can happen sitting/resting, not with activity. Only happens once every few weeks.    She is not limited by chest pain, pressure or tightness.   No significant dyspnea on exertion, orthopnea or lower extremity swelling.   Does note some [place] going upstairs at work carrying boxes.  No presyncope/syncope.   No symptoms of leg claudication.   No stroke/TIA like symptoms.  Prior right thigh turning blue, had testing, resolved, only top of thigh.    Has exercise induced asthma, worse in cold weather.  2 years ago she had elevated BP at a health screening, then PCP and OB said it was high, but it was borderline, then it came back down.  No Tobacco, SLE/RA/CKD.  No DM.  Has a murmur, RSB, not new, heard by OB also.    LDL cholesterol 64, HDL 50.  Father has severely elevated cholesterol.  Father had a few ecgs, then stress test, then CABG at 53, his mother had CABG at 49.    Teaches at [employer], [employer].  , has 2 kids, 10 (son) and 5 (girl).    DATA REVIEWED by me:  ECG 2018  Sinus 69    2 view chest x-ray   Normal    Most recent labs:     2018 hemoglobin 14.3, platelets 287, sodium 135, potassium 4.3, creatinine 0.  7 1, LFTs normal, LDL 64, HDL 50, to glycerides 101, total cholesterol 134    Past Medical History:   Diagnosis Date   • Asthma      History reviewed. No pertinent surgical history.  Family History   Problem Relation Age of Onset   • Heart Disease Father         Father had CABG at 53, after abnl stress and ECG   • Hyperlipidemia Father    • Heart Disease Paternal Grandmother         CABG at 49,  in her  sleep in 70s   • Other Mother         Liver failure after gallbladder surgery, 61     Social History     Socioeconomic History   • Marital status:      Spouse name: Not on file   • Number of children: Not on file   • Years of education: Not on file   • Highest education level: Not on file   Occupational History   • Not on file   Social Needs   • Financial resource strain: Not on file   • Food insecurity:     Worry: Not on file     Inability: Not on file   • Transportation needs:     Medical: Not on file     Non-medical: Not on file   Tobacco Use   • Smoking status: Never Smoker   • Smokeless tobacco: Never Used   Substance and Sexual Activity   • Alcohol use: No   • Drug use: No   • Sexual activity: Not on file   Lifestyle   • Physical activity:     Days per week: Not on file     Minutes per session: Not on file   • Stress: Not on file   Relationships   • Social connections:     Talks on phone: Not on file     Gets together: Not on file     Attends Denominational service: Not on file     Active member of club or organization: Not on file     Attends meetings of clubs or organizations: Not on file     Relationship status: Not on file   • Intimate partner violence:     Fear of current or ex partner: Not on file     Emotionally abused: Not on file     Physically abused: Not on file     Forced sexual activity: Not on file   Other Topics Concern   • Not on file   Social History Narrative   • Not on file     No Known Allergies    Current Outpatient Medications   Medication Sig Dispense Refill   • cetirizine (ZYRTEC) 10 MG Tab Take 10 mg by mouth 1 time daily as needed for Allergies.     • citalopram (CELEXA) 20 MG Tab Take 20 mg by mouth every day. Indications: Depression     • Fluticasone Propionate (FLONASE NA) Spray  in nose.     • miconazole (MICOTIN) 2 % Cream Apply 1 Application to affected area(s) 2 times a day. 1 Tube 0   • Ibuprofen (ADVIL PO) Take  by mouth.     • albuterol 108 (90 BASE) MCG/ACT Aero Soln  "inhalation aerosol Inhale 2 Puffs by mouth every 6 hours as needed for Shortness of Breath.       No current facility-administered medications for this visit.        ROS  All others systems reviewed and negative.     Objective:     /88 (BP Location: Left arm, Patient Position: Sitting, BP Cuff Size: Adult)   Pulse 70   Ht 1.575 m (5' 2\")   Wt 77.6 kg (171 lb)   SpO2 96%  Body mass index is 31.28 kg/m².    Physical Exam   General: No acute distress. Well nourished.  HEENT: EOM grossly intact, no scleral icterus, no pharyngeal erythema.   Neck:  No JVD, no bruits, trachea midline  CVS: RRR. Normal S1, S2. 2/6 early peaking sys murmur RSB. No LE edema.  2+ radial pulses, 2+ PT pulses  Resp: CTAB. No wheezing or crackles/rhonchi. Normal respiratory effort.  Abdomen: Soft, NT, no quentin hepatomegaly.  MSK/Ext: No clubbing or cyanosis.  Skin: Warm and dry, no rashes.  Neurological: CN III-XII grossly intact. No focal deficits.   Psych: A&O x 3, appropriate affect, good judgement      Assessment:     1. Family history of heart disease  EKG   2. Palpitations         Medical Decision Making:  Today's Assessment / Status / Plan:     -Her palpitations are short, not concerning at this time  -Low risk for CAD at her age even with FH  -See below    Written instructions given today:    -At this time, I don't have concerns  -We could at any time have a Zio Patch heart monitor placed if the palpitations/heart racing increase in time, frequency or start to bother you  -At some point consider a Calcium Score to look for premature calcified heart disease to help you know your risk but I don't recommend this until at least your late 40s.        Return if symptoms worsen or fail to improve.    It is my pleasure to participate in the care of Ms. Orozco.  Please do not hesitate to contact me with questions or concerns.    Dorothy Pavon MD, [facility]  Cardiologist [facility]    Please note that " this dictation was created using voice recognition software. I have made every reasonable attempt to correct obvious errors, but it is possible there are errors of grammar and possibly content that I did not discover before finalizing the note.

## 2019-09-16 ENCOUNTER — OFFICE VISIT (OUTPATIENT)
Dept: URGENT CARE | Facility: CLINIC | Age: 39
End: 2019-09-16
Payer: COMMERCIAL

## 2019-09-16 ENCOUNTER — APPOINTMENT (OUTPATIENT)
Dept: RADIOLOGY | Facility: IMAGING CENTER | Age: 39
End: 2019-09-16
Attending: PHYSICIAN ASSISTANT
Payer: COMMERCIAL

## 2019-09-16 VITALS
OXYGEN SATURATION: 97 % | HEART RATE: 78 BPM | SYSTOLIC BLOOD PRESSURE: 122 MMHG | WEIGHT: 168 LBS | TEMPERATURE: 98.4 F | BODY MASS INDEX: 30.73 KG/M2 | RESPIRATION RATE: 20 BRPM | DIASTOLIC BLOOD PRESSURE: 80 MMHG

## 2019-09-16 DIAGNOSIS — J06.9 URI WITH COUGH AND CONGESTION: ICD-10-CM

## 2019-09-16 DIAGNOSIS — J40 BRONCHITIS: Primary | ICD-10-CM

## 2019-09-16 PROCEDURE — 71046 X-RAY EXAM CHEST 2 VIEWS: CPT | Mod: TC | Performed by: PHYSICIAN ASSISTANT

## 2019-09-16 PROCEDURE — 99214 OFFICE O/P EST MOD 30 MIN: CPT | Performed by: PHYSICIAN ASSISTANT

## 2019-09-16 RX ORDER — METHYLPREDNISOLONE 4 MG/1
TABLET ORAL
Qty: 21 TAB | Refills: 0 | Status: SHIPPED | OUTPATIENT
Start: 2019-09-16 | End: 2021-06-15

## 2019-09-16 RX ORDER — DOXYCYCLINE HYCLATE 100 MG
100 TABLET ORAL 2 TIMES DAILY
Qty: 14 TAB | Refills: 0 | Status: SHIPPED | OUTPATIENT
Start: 2019-09-16 | End: 2019-09-23

## 2019-09-16 RX ORDER — PROMETHAZINE HYDROCHLORIDE AND CODEINE PHOSPHATE 6.25; 1 MG/5ML; MG/5ML
5 SYRUP ORAL 4 TIMES DAILY PRN
Qty: 120 ML | Refills: 0 | Status: SHIPPED | OUTPATIENT
Start: 2019-09-16 | End: 2019-09-23

## 2019-09-16 RX ORDER — IPRATROPIUM BROMIDE AND ALBUTEROL SULFATE 2.5; .5 MG/3ML; MG/3ML
3 SOLUTION RESPIRATORY (INHALATION) ONCE
OUTPATIENT
Start: 2019-09-16 | End: 2019-09-17

## 2019-09-16 NOTE — PATIENT INSTRUCTIONS
Acute Bronchitis, Adult  Acute bronchitis is when air tubes (bronchi) in the lungs suddenly get swollen. The condition can make it hard to breathe. It can also cause these symptoms:  · A cough.  · Coughing up clear, yellow, or green mucus.  · Wheezing.  · Chest congestion.  · Shortness of breath.  · A fever.  · Body aches.  · Chills.  · A sore throat.  Follow these instructions at home:  Medicines  · Take over-the-counter and prescription medicines only as told by your doctor.  · If you were prescribed an antibiotic medicine, take it as told by your doctor. Do not stop taking the antibiotic even if you start to feel better.  General instructions  · Rest.  · Drink enough fluids to keep your pee (urine) clear or pale yellow.  · Avoid smoking and secondhand smoke. If you smoke and you need help quitting, ask your doctor. Quitting will help your lungs heal faster.  · Use an inhaler, cool mist vaporizer, or humidifier as told by your doctor.  · Keep all follow-up visits as told by your doctor. This is important.  How is this prevented?  To lower your risk of getting this condition again:  · Wash your hands often with soap and water. If you cannot use soap and water, use hand .  · Avoid contact with people who have cold symptoms.  · Try not to touch your hands to your mouth, nose, or eyes.  · Make sure to get the flu shot every year.  Contact a doctor if:  · Your symptoms do not get better in 2 weeks.  Get help right away if:  · You cough up blood.  · You have chest pain.  · You have very bad shortness of breath.  · You become dehydrated.  · You faint (pass out) or keep feeling like you are going to pass out.  · You keep throwing up (vomiting).  · You have a very bad headache.  · Your fever or chills gets worse.  This information is not intended to replace advice given to you by your health care provider. Make sure you discuss any questions you have with your health care provider.  Document Released: 06/05/2009  Document Revised: 07/26/2017 Document Reviewed: 06/07/2017  [employer] Interactive Patient Education © 2017 [employer]

## 2019-09-16 NOTE — PROGRESS NOTES
Subjective:    Pt is a 39 y.o. female who presents with Asthma (Couple days trouble breathing .)            HPI  This is a new problem. PT presents to  clinic today complaining of sore throat, fevers, pressure in ears, cough, fatigue, runny nose, wheezing and SOB. PT denies CP, NVD, abdominal pain, joint pain. PT states these symptoms began around 3 days ago. PT states the pain is a 7/10 with coughing fits, aching in nature and worse at night. Pt has not taken any RX medications for this condition. The pt's medication list, problem list, and allergies have been evaluated and reviewed during today's visit.    PMH:  Past Medical History:   Diagnosis Date   • Asthma        PSH:  Negative per pt.      Fam Hx:    family history includes Heart Disease in her father and paternal grandmother; Hyperlipidemia in her father; Other in her mother.  Family Status   Relation Name Status   • Fa  Alive   • PGMo     • Mo         Soc HX:  Social History     Socioeconomic History   • Marital status:      Spouse name: Not on file   • Number of children: Not on file   • Years of education: Not on file   • Highest education level: Not on file   Occupational History   • Not on file   Social Needs   • Financial resource strain: Not on file   • Food insecurity:     Worry: Not on file     Inability: Not on file   • Transportation needs:     Medical: Not on file     Non-medical: Not on file   Tobacco Use   • Smoking status: Never Smoker   • Smokeless tobacco: Never Used   Substance and Sexual Activity   • Alcohol use: No   • Drug use: No   • Sexual activity: Not on file   Lifestyle   • Physical activity:     Days per week: Not on file     Minutes per session: Not on file   • Stress: Not on file   Relationships   • Social connections:     Talks on phone: Not on file     Gets together: Not on file     Attends Pentecostal service: Not on file     Active member of club or organization: Not on file     Attends meetings of  clubs or organizations: Not on file     Relationship status: Not on file   • Intimate partner violence:     Fear of current or ex partner: Not on file     Emotionally abused: Not on file     Physically abused: Not on file     Forced sexual activity: Not on file   Other Topics Concern   • Not on file   Social History Narrative   • Not on file         Medications:    Current Outpatient Medications:   •  doxycycline (VIBRAMYCIN) 100 MG Tab, Take 1 Tab by mouth 2 times a day for 7 days., Disp: 14 Tab, Rfl: 0  •  promethazine-codeine (PHENERGAN-CODEINE) 6.25-10 MG/5ML Syrup, Take 5 mL by mouth 4 times a day as needed for up to 7 days., Disp: 120 mL, Rfl: 0  •  methylPREDNISolone (MEDROL DOSEPAK) 4 MG Tablet Therapy Pack, Use as directed, Disp: 21 Tab, Rfl: 0  •  cetirizine (ZYRTEC) 10 MG Tab, Take 10 mg by mouth 1 time daily as needed for Allergies., Disp: , Rfl:   •  citalopram (CELEXA) 20 MG Tab, Take 20 mg by mouth every day. Indications: Depression, Disp: , Rfl:   •  Fluticasone Propionate (FLONASE NA), Spray  in nose., Disp: , Rfl:   •  miconazole (MICOTIN) 2 % Cream, Apply 1 Application to affected area(s) 2 times a day., Disp: 1 Tube, Rfl: 0  •  Ibuprofen (ADVIL PO), Take  by mouth., Disp: , Rfl:   •  albuterol 108 (90 BASE) MCG/ACT Aero Soln inhalation aerosol, Inhale 2 Puffs by mouth every 6 hours as needed for Shortness of Breath., Disp: , Rfl:     Current Facility-Administered Medications:   •  ipratropium-albuterol (DUONEB) nebulizer solution, 3 mL, Nebulization, Once, Alfonso Diaz P.A.-C.      Allergies:  Patient has no known allergies.    ROS    Review of Systems   Constitutional: Positive for malaise/fatigue. Negative for fever and diaphoresis.   HENT: Positive for congestion and sore throat. Negative for ear discharge, hearing loss, nosebleeds and tinnitus.    Eyes: Negative for blurred vision, double vision and photophobia.   Respiratory: Positive for cough, sputum production, shortness of breath and  wheezing. Negative for hemoptysis.    Cardiovascular: Negative for chest pain and palpitations.   Gastrointestinal: Negative for nausea, vomiting, abdominal pain, diarrhea and constipation.   Genitourinary: Negative for dysuria and flank pain.   Musculoskeletal: Negative for joint pain and myalgias.   Skin: Negative for itching and rash.   Neurological:  Negative for dizziness, tingling and weakness.   Endo/Heme/Allergies: Does not bruise/bleed easily.   Psychiatric/Behavioral: Negative for depression. The patient is not nervous/anxious.           Objective:     /80   Pulse 78   Temp 36.9 °C (98.4 °F) (Temporal)   Resp 20   Wt 76.2 kg (168 lb)   SpO2 97%   BMI 30.73 kg/m²      Physical Exam      Physical Exam   Constitutional: PT is oriented to person, place, and time. PT appears well-developed and well-nourished. No distress.   HENT:   Head: Normocephalic and atraumatic.   Right Ear: Hearing, tympanic membrane, external ear and ear canal normal.   Left Ear: Hearing, tympanic membrane, external ear and ear canal normal.   Nose: Mucosal edema, rhinorrhea and sinus tenderness present. Right sinus exhibits frontal sinus tenderness. Left sinus exhibits frontal sinus tenderness.   Mouth/Throat: Uvula is midline. Mucous membranes are pale. Posterior oropharyngeal edema and posterior oropharyngeal erythema present. No oropharyngeal exudate.   Eyes: Conjunctivae normal and EOM are normal. Pupils are equal, round, and reactive to light. Right eye exhibits no discharge. Left eye exhibits no discharge.   Neck: Normal range of motion. Neck supple. No thyromegaly present.   Cardiovascular: Normal rate, regular rhythm, normal heart sounds and intact distal pulses.  Exam reveals no gallop and no friction rub.    No murmur heard.  Pulmonary/Chest: Effort normal. No respiratory distress. PT has wheezes. PT has no rales. PT exhibits tenderness.   Abdominal: Soft. Bowel sounds are normal. PT exhibits no distension and no  mass. There is no tenderness. There is no rebound and no guarding.   Musculoskeletal: Normal range of motion. PT exhibits no edema and no tenderness.   Lymphadenopathy:     PT has no cervical adenopathy.   Neurological: Pt is alert and oriented to person, place, and time. Pt has normal reflexes. No cranial nerve deficit.   Skin: Skin is warm and dry. No rash noted. No erythema.   Psychiatric: PT has a normal mood and affect. Pt behavior is normal. Judgment and thought content normal.     RADS:  DX-CHEST-2 VIEWS   Order: [id number]   Status:  Final result   Visible to patient:  No (Not Released)   Next appt:  None   Dx:  URI with cough and congestion   Details     Reading Physician Reading Date Result Priority   Kavin Rivera M.D. 9/16/2019 Urgent [facility]      Narrative       9/16/2019 12:10 PM    HISTORY/REASON FOR EXAM:  Cough and congestion    TECHNIQUE/EXAM DESCRIPTION AND NUMBER OF VIEWS:  Two views of the chest.    COMPARISON: None.    FINDINGS:  There is no evidence of focal consolidation or evidence of pulmonary edema.  The heart is normal in size.  There is no evidence of pleural effusion.  Soft tissues and bony structures are unremarkable.        Impression       No evidence of acute cardiopulmonary process.            Last Resulted: 09/16/19 12:21 PM                Assessment/Plan:     1. Bronchitis    - ipratropium-albuterol (DUONEB) nebulizer solution  - doxycycline (VIBRAMYCIN) 100 MG Tab; Take 1 Tab by mouth 2 times a day for 7 days.  Dispense: 14 Tab; Refill: 0  - promethazine-codeine (PHENERGAN-CODEINE) 6.25-10 MG/5ML Syrup; Take 5 mL by mouth 4 times a day as needed for up to 7 days.  Dispense: 120 mL; Refill: 0  - methylPREDNISolone (MEDROL DOSEPAK) 4 MG Tablet Therapy Pack; Use as directed  Dispense: 21 Tab; Refill: 0    2. URI with cough and congestion    - DX-CHEST-2 VIEWS; Future  - ipratropium-albuterol (DUONEB) nebulizer solution  - promethazine-codeine (PHENERGAN-CODEINE) 6.25-10 MG/5ML Syrup;  Take 5 mL by mouth 4 times a day as needed for up to 7 days.  Dispense: 120 mL; Refill: 0    Rest, fluids encouraged.  OTC decongestant for congestion/cough  AVS with medical info given.  Pt was in full understanding and agreement with the plan.  Differential diagnosis, natural history, supportive care, and indications for immediate follow-up discussed. All questions answered. Patient agrees with the plan of care.  Follow-up as needed if symptoms worsen or fail to improve.

## 2020-01-03 ENCOUNTER — HOSPITAL ENCOUNTER (OUTPATIENT)
Dept: LAB | Facility: MEDICAL CENTER | Age: 40
End: 2020-01-03
Attending: FAMILY MEDICINE
Payer: COMMERCIAL

## 2020-01-03 LAB
APPEARANCE UR: ABNORMAL
BACTERIA #/AREA URNS HPF: ABNORMAL /HPF
BILIRUB UR QL STRIP.AUTO: NEGATIVE
COLOR UR: YELLOW
EPI CELLS #/AREA URNS HPF: ABNORMAL /HPF
GLUCOSE UR STRIP.AUTO-MCNC: NEGATIVE MG/DL
HYALINE CASTS #/AREA URNS LPF: ABNORMAL /LPF
KETONES UR STRIP.AUTO-MCNC: 40 MG/DL
LEUKOCYTE ESTERASE UR QL STRIP.AUTO: ABNORMAL
MICRO URNS: ABNORMAL
NITRITE UR QL STRIP.AUTO: NEGATIVE
PH UR STRIP.AUTO: 6 [PH] (ref 5–8)
PROT UR QL STRIP: 30 MG/DL
RBC # URNS HPF: ABNORMAL /HPF
RBC UR QL AUTO: ABNORMAL
SP GR UR STRIP.AUTO: 1.04
UROBILINOGEN UR STRIP.AUTO-MCNC: 0.2 MG/DL
WBC #/AREA URNS HPF: ABNORMAL /HPF

## 2020-01-03 PROCEDURE — 81001 URINALYSIS AUTO W/SCOPE: CPT

## 2020-01-03 PROCEDURE — 87086 URINE CULTURE/COLONY COUNT: CPT

## 2020-01-03 PROCEDURE — 87077 CULTURE AEROBIC IDENTIFY: CPT

## 2020-01-06 LAB
BACTERIA UR CULT: ABNORMAL
BACTERIA UR CULT: ABNORMAL
SIGNIFICANT IND 70042: ABNORMAL
SITE SITE: ABNORMAL
SOURCE SOURCE: ABNORMAL

## 2020-11-04 ENCOUNTER — HOSPITAL ENCOUNTER (OUTPATIENT)
Dept: LAB | Facility: MEDICAL CENTER | Age: 40
End: 2020-11-04
Payer: COMMERCIAL

## 2020-11-04 PROCEDURE — U0003 INFECTIOUS AGENT DETECTION BY NUCLEIC ACID (DNA OR RNA); SEVERE ACUTE RESPIRATORY SYNDROME CORONAVIRUS 2 (SARS-COV-2) (CORONAVIRUS DISEASE [COVID-19]), AMPLIFIED PROBE TECHNIQUE, MAKING USE OF HIGH THROUGHPUT TECHNOLOGIES AS DESCRIBED BY CMS-2020-01-R: HCPCS

## 2020-11-05 LAB
COVID ORDER STATUS COVID19: NORMAL
SARS-COV-2 RNA RESP QL NAA+PROBE: NOTDETECTED
SPECIMEN SOURCE: NORMAL

## 2020-12-07 ENCOUNTER — HOSPITAL ENCOUNTER (OUTPATIENT)
Dept: RADIOLOGY | Facility: MEDICAL CENTER | Age: 40
End: 2020-12-07
Attending: OBSTETRICS & GYNECOLOGY
Payer: COMMERCIAL

## 2020-12-07 DIAGNOSIS — Z12.31 VISIT FOR SCREENING MAMMOGRAM: ICD-10-CM

## 2020-12-07 PROCEDURE — 77067 SCR MAMMO BI INCL CAD: CPT

## 2021-01-21 ENCOUNTER — HOSPITAL ENCOUNTER (OUTPATIENT)
Dept: LAB | Facility: MEDICAL CENTER | Age: 41
End: 2021-01-21
Payer: COMMERCIAL

## 2021-01-21 PROCEDURE — U0003 INFECTIOUS AGENT DETECTION BY NUCLEIC ACID (DNA OR RNA); SEVERE ACUTE RESPIRATORY SYNDROME CORONAVIRUS 2 (SARS-COV-2) (CORONAVIRUS DISEASE [COVID-19]), AMPLIFIED PROBE TECHNIQUE, MAKING USE OF HIGH THROUGHPUT TECHNOLOGIES AS DESCRIBED BY CMS-2020-01-R: HCPCS

## 2021-01-21 PROCEDURE — U0005 INFEC AGEN DETEC AMPLI PROBE: HCPCS

## 2021-06-11 ENCOUNTER — TELEPHONE (OUTPATIENT)
Dept: SCHEDULING | Facility: IMAGING CENTER | Age: 41
End: 2021-06-11

## 2021-06-15 ENCOUNTER — OFFICE VISIT (OUTPATIENT)
Dept: MEDICAL GROUP | Facility: MEDICAL CENTER | Age: 41
End: 2021-06-15
Payer: COMMERCIAL

## 2021-06-15 VITALS
DIASTOLIC BLOOD PRESSURE: 82 MMHG | HEART RATE: 70 BPM | WEIGHT: 146.39 LBS | RESPIRATION RATE: 18 BRPM | TEMPERATURE: 97.8 F | SYSTOLIC BLOOD PRESSURE: 112 MMHG | BODY MASS INDEX: 25.94 KG/M2 | OXYGEN SATURATION: 96 % | HEIGHT: 63 IN

## 2021-06-15 DIAGNOSIS — Z13.0 SCREENING FOR DEFICIENCY ANEMIA: ICD-10-CM

## 2021-06-15 DIAGNOSIS — E55.9 VITAMIN D INSUFFICIENCY: ICD-10-CM

## 2021-06-15 DIAGNOSIS — Z13.1 SCREENING FOR DIABETES MELLITUS: ICD-10-CM

## 2021-06-15 DIAGNOSIS — J45.40 MODERATE PERSISTENT ASTHMA WITHOUT COMPLICATION: ICD-10-CM

## 2021-06-15 DIAGNOSIS — Z13.220 ENCOUNTER FOR LIPID SCREENING FOR CARDIOVASCULAR DISEASE: ICD-10-CM

## 2021-06-15 DIAGNOSIS — Z13.6 ENCOUNTER FOR LIPID SCREENING FOR CARDIOVASCULAR DISEASE: ICD-10-CM

## 2021-06-15 DIAGNOSIS — Z83.49 FAMILY HISTORY OF HEMOCHROMATOSIS: ICD-10-CM

## 2021-06-15 PROCEDURE — 99214 OFFICE O/P EST MOD 30 MIN: CPT | Performed by: FAMILY MEDICINE

## 2021-06-15 RX ORDER — AMOXICILLIN AND CLAVULANATE POTASSIUM 875; 125 MG/1; MG/1
TABLET, FILM COATED ORAL
COMMUNITY
Start: 2021-05-29 | End: 2021-06-15

## 2021-06-15 RX ORDER — NORETHINDRONE ACETATE AND ETHINYL ESTRADIOL, ETHINYL ESTRADIOL AND FERROUS FUMARATE 1MG-10(24)
KIT ORAL
COMMUNITY
Start: 2021-05-15 | End: 2023-03-02

## 2021-06-15 RX ORDER — MONTELUKAST SODIUM 10 MG/1
10 TABLET ORAL DAILY
Qty: 30 TABLET | Refills: 11 | Status: SHIPPED | OUTPATIENT
Start: 2021-06-15 | End: 2022-02-04

## 2021-06-15 RX ORDER — PREDNISONE 20 MG/1
40 TABLET ORAL DAILY
Qty: 10 TABLET | Refills: 0 | Status: SHIPPED | OUTPATIENT
Start: 2021-06-15 | End: 2021-06-20

## 2021-06-15 RX ORDER — FLUTICASONE PROPIONATE 220 UG/1
AEROSOL, METERED RESPIRATORY (INHALATION)
COMMUNITY
Start: 2021-05-28 | End: 2023-03-02

## 2021-06-15 RX ORDER — FLUTICASONE PROPIONATE 50 MCG
SPRAY, SUSPENSION (ML) NASAL
COMMUNITY
Start: 2021-04-08

## 2021-06-15 ASSESSMENT — ENCOUNTER SYMPTOMS
WEIGHT LOSS: 0
DOUBLE VISION: 0
BRUISES/BLEEDS EASILY: 0
FEVER: 0
PALPITATIONS: 0
CHILLS: 0
WHEEZING: 1
COUGH: 1
NAUSEA: 0
DEPRESSION: 0
DIARRHEA: 0
SHORTNESS OF BREATH: 1
CONSTIPATION: 0
MYALGIAS: 0
WEAKNESS: 0
DIZZINESS: 0
BLURRED VISION: 0

## 2021-06-15 ASSESSMENT — PATIENT HEALTH QUESTIONNAIRE - PHQ9: CLINICAL INTERPRETATION OF PHQ2 SCORE: 0

## 2021-06-15 NOTE — PROGRESS NOTES
Sarah Orozco is a pleasant 41 y.o. female here to establish care.    HPI:   Moderate persistent asthma without complication  Over the last 2 to 3 months patient reports a worsening of her asthma type symptoms.  She has made an attempt to schedule an appointment with pulmonologist for further evaluation of her asthma but was unable to do so as she needed a referral from a general practitioner.  She has been seen by the telemetry doc several different occasions and sent home with 2 different antibiotic treatments.   Her most recent visit which was just a few weeks ago, advised to to take an over-the-counter allergy medication.  She reports that she uses her albuterol inhaler approximately 2 times a day, 2 times nightly awakenings due to asthma type symptoms.    She has not had any steroid treatments for some time.  She does report improvement of her itchy watery eyes since taking the over-the-counter Allegra.  She is also taking fluticasone as well as Flovent to help with her symptoms.  She reports that her asthma flareups are so bad now that she is using a nebulizer versus the inhaler.    Denies any chest pain, current shortness of breath.    Vitamin D insufficiency  Patient had her vitamin D levels checked in the past and they were low.  She does not currently take a vitamin D supplementation at this time.      Family history of hemochromatosis  Patient reports a family history of hemochromatosis.  She states that she had genetic testing done which showed that she also has the gene for hemochromatosis.  She has not been diagnosed with this before nor has difficulty with iron levels in the past.  Her most recent iron level was approximately 1 year ago, she had did not [facility].      Health Maintenance  Patient is up-to-date with all of her health maintenance.  Her most recent Pap was approximately 1 year ago, normal.  Followed by GYN    Current medicines (including changes today)  Current  Outpatient Medications   Medication Sig Dispense Refill   • fluticasone (FLONASE) 50 MCG/ACT nasal spray      • FLOVENT  MCG/ACT Aerosol      • LO LOESTRIN FE 1 MG-10 MCG / 10 MCG Tab      • Fexofenadine HCl (ALLEGRA PO) Take  by mouth.     • montelukast (SINGULAIR) 10 MG Tab Take 1 tablet by mouth every day. 30 tablet 11   • predniSONE (DELTASONE) 20 MG Tab Take 2 Tablets by mouth every day for 5 days. 10 tablet 0   • Ibuprofen (ADVIL PO) Take  by mouth.     • albuterol 108 (90 BASE) MCG/ACT Aero Soln inhalation aerosol Inhale 2 Puffs by mouth every 6 hours as needed for Shortness of Breath.       No current facility-administered medications for this visit.       Past Medical/ Surgical History  She  has a past medical history of Asthma.  She  has a past surgical history that includes dental extraction(s).    Social History  Social History     Tobacco Use   • Smoking status: Never Smoker   • Smokeless tobacco: Never Used   Vaping Use   • Vaping Use: Never used   Substance Use Topics   • Alcohol use: No     Comment: rare   • Drug use: No     Social History     Social History Narrative   • Not on file        Family History  Family History   Problem Relation Age of Onset   • Heart Disease Father         Father had CABG at 53, after abnl stress and ECG   • Hyperlipidemia Father    • Heart Disease Paternal Grandmother         CABG at 49,  in her sleep in 70s   • Other Mother         Liver failure after gallbladder surgery, 61   • Alcohol abuse Sister         possible   • Drug abuse Brother    • Other Maternal Uncle         hemochromotosis   • Parkinson's Disease Maternal Grandmother    • Stroke Maternal Grandfather    • Other Paternal Grandfather         possible brain annuyr   • Drug abuse Sister    • Allergies Son    • No Known Problems Daughter      Family Status   Relation Name Status   • Fa  Alive   • PGMo     • Mo     • Sis rebecca Alive   • Bro John Alive   • MUnc  (Not Specified)   • MGMo   "   • MGFa     • PGFa unknow,young 40s    • Maribel Sánchez Alive   • Son  Alive   • Ally  Alive         Review of Systems   Constitutional: Negative for chills, fever and weight loss.   HENT: Negative for hearing loss.    Eyes: Negative for blurred vision and double vision.   Respiratory: Positive for cough, shortness of breath and wheezing.    Cardiovascular: Negative for chest pain, palpitations and leg swelling.   Gastrointestinal: Negative for constipation, diarrhea and nausea.   Genitourinary: Negative.    Musculoskeletal: Negative for myalgias.   Skin: Negative for rash.   Neurological: Negative for dizziness and weakness.   Endo/Heme/Allergies: Does not bruise/bleed easily.   Psychiatric/Behavioral: Negative for depression.         Objective:     /82 (BP Location: Left arm, Patient Position: Sitting, BP Cuff Size: Adult)   Pulse 70   Temp 36.6 °C (97.8 °F) (Temporal)   Resp 18   Ht 1.6 m (5' 3\")   Wt 66.4 kg (146 lb 6.2 oz)   SpO2 96%  Body mass index is 25.93 kg/m².    Physical Exam  Constitutional:       General: She is not in acute distress.  HENT:      Head: Normocephalic and atraumatic.      Right Ear: External ear normal.      Left Ear: External ear normal.   Eyes:      Conjunctiva/sclera: Conjunctivae normal.      Pupils: Pupils are equal, round, and reactive to light.   Cardiovascular:      Rate and Rhythm: Normal rate and regular rhythm.      Heart sounds: No murmur heard.   No friction rub. No gallop.    Pulmonary:      Effort: Pulmonary effort is normal. No tachypnea, accessory muscle usage or prolonged expiration.      Breath sounds: Normal breath sounds and air entry. No stridor or decreased air movement. No wheezing or rales.   Abdominal:      General: Bowel sounds are normal.      Palpations: Abdomen is soft.      Tenderness: There is no abdominal tenderness.   Musculoskeletal:      Cervical back: Normal range of motion and neck supple.   Skin:     General: Skin is " warm and dry.      Findings: No rash.   Neurological:      Mental Status: She is alert and oriented to person, place, and time.      Gait: Gait is intact.   Psychiatric:         Mood and Affect: Affect normal.        Imagin2020 MA-screening mammo:  IMPRESSION:        1.  Breasts have scattered areas of fibroglandular density, with no radiographic evidence of malignancy.  2.  Upper outer left breast mass has benign imaging features  3.  Routine mammographic screening follow-up in 1 year is recommended.    Labs:  No recent blood work to review  Assessment and Plan:   The following treatment plan was discussed    1. Moderate persistent asthma without complication  Chronic, uncontrolled.  We will go ahead and start patient on montelukast and a short course of steroid therapy.  I did advise the patient that if this is not helpful for her asthma type symptoms we will go ahead and refer her to pulmonary medicine.  I suspect that this is secondary to her allergies.  She did not have any difficulty breathing, wheezing, prolonged expiratory phase while in clinic.  - montelukast (SINGULAIR) 10 MG Tab; Take 1 tablet by mouth every day.  Dispense: 30 tablet; Refill: 11  - REFERRAL TO PULMONARY AND SLEEP MEDICINE  - predniSONE (DELTASONE) 20 MG Tab; Take 2 Tablets by mouth every day for 5 days.  Dispense: 10 tablet; Refill: 0    2. Vitamin D insufficiency  Chronic, uncontrolled.  Patient's blood work from 3 years ago did showed some low vitamin D levels.  We will go ahead and recheck these levels.  - VITAMIN D,25 HYDROXY; Future    3. Family history of hemochromatosis  Patient had her blood levels checked approximately 1 year ago, did not get the [facility] facility.  We will go ahead and recheck her CBC, iron, ferritin.  - CBC WITH DIFFERENTIAL; Future  - IRON/TOTAL IRON BIND; Future  - FERRITIN; Future    4. Encounter for lipid screening for cardiovascular disease  She has not had her cholesterol levels checked in  approximately 1 year we will go ahead and recheck these.  - Lipid Profile; Future    5. Screening for deficiency anemia  - CBC WITH DIFFERENTIAL; Future  - IRON/TOTAL IRON BIND; Future  - FERRITIN; Future    6. Screening for diabetes mellitus  - Comp Metabolic Panel; Future  - ESTIMATED GFR; Future      Records requested.  Followup: Return in about 6 weeks (around 7/27/2021).    Please note that this dictation was created using voice recognition software. I have made every reasonable attempt to correct obvious errors, but I expect that there are errors of grammar and possibly content that I did not discover before finalizing the note.

## 2021-06-15 NOTE — ASSESSMENT & PLAN NOTE
Patient had her vitamin D levels checked in the past and they were low.  She does not currently take a vitamin D supplementation at this time.

## 2021-06-15 NOTE — ASSESSMENT & PLAN NOTE
Over the last 2 to 3 months patient reports a worsening of her asthma type symptoms.  She has made an attempt to schedule an appointment with pulmonologist for further evaluation of her asthma but was unable to do so as she needed a referral from a general practitioner.  She has been seen by the telemetry doc several different occasions and sent home with 2 different antibiotic treatments.   Her most recent visit which was just a few weeks ago, advised to to take an over-the-counter allergy medication.  She reports that she uses her albuterol inhaler approximately 2 times a day, 2 times nightly awakenings due to asthma type symptoms.    She has not had any steroid treatments for some time.  She does report improvement of her itchy watery eyes since taking the over-the-counter Allegra.  She is also taking fluticasone as well as Flovent to help with her symptoms.  She reports that her asthma flareups are so bad now that she is using a nebulizer versus the inhaler.    Denies any chest pain, current shortness of breath.

## 2021-07-26 ENCOUNTER — TELEPHONE (OUTPATIENT)
Dept: MEDICAL GROUP | Facility: MEDICAL CENTER | Age: 41
End: 2021-07-26

## 2021-08-27 ENCOUNTER — OFFICE VISIT (OUTPATIENT)
Dept: MEDICAL GROUP | Facility: MEDICAL CENTER | Age: 41
End: 2021-08-27
Payer: COMMERCIAL

## 2021-08-27 VITALS
WEIGHT: 176 LBS | BODY MASS INDEX: 31.18 KG/M2 | OXYGEN SATURATION: 96 % | SYSTOLIC BLOOD PRESSURE: 122 MMHG | DIASTOLIC BLOOD PRESSURE: 88 MMHG | HEART RATE: 64 BPM | TEMPERATURE: 97.8 F

## 2021-08-27 DIAGNOSIS — E66.9 OBESITY (BMI 30-39.9): ICD-10-CM

## 2021-08-27 DIAGNOSIS — R53.83 TIREDNESS: ICD-10-CM

## 2021-08-27 DIAGNOSIS — F33.1 MODERATE EPISODE OF RECURRENT MAJOR DEPRESSIVE DISORDER (HCC): ICD-10-CM

## 2021-08-27 PROBLEM — F32.9 MAJOR DEPRESSIVE DISORDER: Status: ACTIVE | Noted: 2021-08-27

## 2021-08-27 PROCEDURE — 99213 OFFICE O/P EST LOW 20 MIN: CPT | Performed by: FAMILY MEDICINE

## 2021-08-27 RX ORDER — NAPROXEN 500 MG/1
TABLET ORAL
COMMUNITY
Start: 2021-08-19

## 2021-08-27 RX ORDER — CITALOPRAM 20 MG/1
TABLET ORAL
Qty: 21 TABLET | Refills: 0 | Status: SHIPPED | OUTPATIENT
Start: 2021-08-27 | End: 2021-09-27

## 2021-08-27 ASSESSMENT — ENCOUNTER SYMPTOMS
WEAKNESS: 0
WEIGHT LOSS: 0
COUGH: 0
FEVER: 0
DIZZINESS: 0
SHORTNESS OF BREATH: 0
DEPRESSION: 1
NERVOUS/ANXIOUS: 1
ABDOMINAL PAIN: 0
MYALGIAS: 0
PALPITATIONS: 0
CHILLS: 0

## 2021-08-27 ASSESSMENT — PATIENT HEALTH QUESTIONNAIRE - PHQ9
SUM OF ALL RESPONSES TO PHQ QUESTIONS 1-9: 12
CLINICAL INTERPRETATION OF PHQ2 SCORE: 2
5. POOR APPETITE OR OVEREATING: 3 - NEARLY EVERY DAY

## 2021-08-27 ASSESSMENT — ANXIETY QUESTIONNAIRES
4. TROUBLE RELAXING: SEVERAL DAYS
2. NOT BEING ABLE TO STOP OR CONTROL WORRYING: SEVERAL DAYS
GAD7 TOTAL SCORE: 9
7. FEELING AFRAID AS IF SOMETHING AWFUL MIGHT HAPPEN: NOT AT ALL
6. BECOMING EASILY ANNOYED OR IRRITABLE: MORE THAN HALF THE DAYS
3. WORRYING TOO MUCH ABOUT DIFFERENT THINGS: NEARLY EVERY DAY
5. BEING SO RESTLESS THAT IT IS HARD TO SIT STILL: NOT AT ALL
1. FEELING NERVOUS, ANXIOUS, OR ON EDGE: MORE THAN HALF THE DAYS

## 2021-08-27 NOTE — ASSESSMENT & PLAN NOTE
Reports that she has noticed chronic fatigue and tiredness for the last several years but has been progressively worse over the last week.  Patient states that she no longer has motivation for physical activity.  She would like to have blood work completed to ensure that its not a thyroid complication.

## 2021-08-27 NOTE — ASSESSMENT & PLAN NOTE
Patient presents she has noticed that her depression is starting to progressively get worse.  She was taking citalopram prior, stopped it and made to see how her symptoms were.  She reports that over the last couple weeks it has progressively gotten worse, she no longer has the motivation to stay physically fit.  She would like to be started back on her medication.  Not interested in psychology at this time as she done it prior and didn't feel it was very beneficial for her.    Depression Screening    Little interest or pleasure in doing things?  1 - several days   Feeling down, depressed , or hopeless? 1 - several days   Trouble falling or staying asleep, or sleeping too much?  3 - nearly every day   Feeling tired or having little energy?  3 - nearly every day   Poor appetite or overeating?  3 - nearly every day   Feeling bad about yourself - or that you are a failure or have let yourself or your family down? 1 - several days   Trouble concentrating on things, such as reading the newspaper or watching television? 0 - not at all   Moving or speaking so slowly that other people could have noticed.  Or the opposite - being so fidgety or restless that you have been moving around a lot more than usual?  0 - not at all   Thoughts that you would be better off dead, or of hurting yourself?  0 - not at all   Patient Health Questionnaire Score: 12       If depressive symptoms identified deferred to follow up visit unless specifically addressed in assesment and plan.    Interpretation of PHQ-9 Total Score   Score Severity   1-4 No Depression   5-9 Mild Depression   10-14 Moderate Depression   15-19 Moderately Severe Depression   20-27 Severe Depression    DONNY-7 Questionnaire    Feeling nervous, anxious, or on edge: More than half the days  Not being able to sop or control worrying: Several days  Worrying too much about different things: Nearly every day  Trouble relaxing: Several days  Being so restless that it's hard to sit  still: Not at all  Becoming easily annoyed or irritable: More than half the days  Feeling afraid as if something awful might happen: Not at all  Total: 9    Interpretation of DONYN 7 Total Score   Score Severity :  0-4 No Anxiety   5-9 Mild Anxiety  10-14 Moderate Anxiety  15-21 Severe Anxiety

## 2021-08-27 NOTE — PROGRESS NOTES
Sarah Orozco is a pleasant 41 y.o. female here for depression and fatigue.    HPI:   Tiredness  Reports that she has noticed chronic fatigue and tiredness for the last several years but has been progressively worse over the last week.  Patient states that she no longer has motivation for physical activity.  She would like to have blood work completed to ensure that its not a thyroid complication.      Major depressive disorder  Patient presents she has noticed that her depression is starting to progressively get worse.  She was taking citalopram prior, stopped it and made to see how her symptoms were.  She reports that over the last couple weeks it has progressively gotten worse, she no longer has the motivation to stay physically fit.  She would like to be started back on her medication.  Not interested in psychology at this time as she done it prior and didn't feel it was very beneficial for her.    Depression Screening    Little interest or pleasure in doing things?  1 - several days   Feeling down, depressed , or hopeless? 1 - several days   Trouble falling or staying asleep, or sleeping too much?  3 - nearly every day   Feeling tired or having little energy?  3 - nearly every day   Poor appetite or overeating?  3 - nearly every day   Feeling bad about yourself - or that you are a failure or have let yourself or your family down? 1 - several days   Trouble concentrating on things, such as reading the newspaper or watching television? 0 - not at all   Moving or speaking so slowly that other people could have noticed.  Or the opposite - being so fidgety or restless that you have been moving around a lot more than usual?  0 - not at all   Thoughts that you would be better off dead, or of hurting yourself?  0 - not at all   Patient Health Questionnaire Score: 12       If depressive symptoms identified deferred to follow up visit unless specifically addressed in assesment and plan.    Interpretation of PHQ-9  Total Score   Score Severity   1-4 No Depression   5-9 Mild Depression   10-14 Moderate Depression   15-19 Moderately Severe Depression   20-27 Severe Depression    DONNY-7 Questionnaire    Feeling nervous, anxious, or on edge: More than half the days  Not being able to sop or control worrying: Several days  Worrying too much about different things: Nearly every day  Trouble relaxing: Several days  Being so restless that it's hard to sit still: Not at all  Becoming easily annoyed or irritable: More than half the days  Feeling afraid as if something awful might happen: Not at all  Total: 9    Interpretation of DONNY 7 Total Score   Score Severity :  0-4 No Anxiety   5-9 Mild Anxiety  10-14 Moderate Anxiety  15-21 Severe Anxiety      Health Maintenance  Immunization: Patient is due for her pneumococcal vaccine.    Current Medicines (including changes today)  Current Outpatient Medications   Medication Sig Dispense Refill   • citalopram (CELEXA) 20 MG Tab Take 0.5 Tablets by mouth every day for 14 days, THEN 1 Tablet every day for 14 days. 21 Tablet 0   • naproxen (NAPROSYN) 500 MG Tab      • fluticasone (FLONASE) 50 MCG/ACT nasal spray      • FLOVENT  MCG/ACT Aerosol      • LO LOESTRIN FE 1 MG-10 MCG / 10 MCG Tab      • Fexofenadine HCl (ALLEGRA PO) Take  by mouth.     • montelukast (SINGULAIR) 10 MG Tab Take 1 tablet by mouth every day. 30 tablet 11   • Ibuprofen (ADVIL PO) Take  by mouth.     • albuterol 108 (90 BASE) MCG/ACT Aero Soln inhalation aerosol Inhale 2 Puffs by mouth every 6 hours as needed for Shortness of Breath.       No current facility-administered medications for this visit.     Past Medical/ Surgical History  She  has a past medical history of Asthma.  She  has a past surgical history that includes dental extraction(s).    Review of Systems   Constitutional: Positive for malaise/fatigue. Negative for chills, fever and weight loss.   Respiratory: Negative for cough and shortness of breath.     Cardiovascular: Negative for chest pain and palpitations.   Gastrointestinal: Negative for abdominal pain.   Genitourinary: Negative.    Musculoskeletal: Negative for myalgias.   Skin: Negative for rash.   Neurological: Negative for dizziness and weakness.   Psychiatric/Behavioral: Positive for depression. The patient is nervous/anxious.          Objective:     /88 (BP Location: Left arm, Patient Position: Sitting, BP Cuff Size: Adult)   Pulse 64   Temp 36.6 °C (97.8 °F) (Temporal)   Wt 79.8 kg (176 lb)   SpO2 96%  Body mass index is 31.18 kg/m².    Physical Exam  Constitutional:       General: She is not in acute distress.  HENT:      Head: Normocephalic and atraumatic.   Eyes:      Conjunctiva/sclera: Conjunctivae normal.      Pupils: Pupils are equal, round, and reactive to light.   Pulmonary:      Effort: Pulmonary effort is normal. No respiratory distress.   Abdominal:      General: There is no distension.   Musculoskeletal:      Cervical back: Normal range of motion and neck supple.   Skin:     General: Skin is warm and dry.      Findings: No rash.   Neurological:      Mental Status: She is alert and oriented to person, place, and time.      Gait: Gait is intact.   Psychiatric:         Mood and Affect: Affect normal.        Imaging:  No recent imaging to review    Labs  No recent labs to review  Assessment and Plan:   The following treatment plan was discussed    1. Moderate episode of recurrent major depressive disorder (HCC)  Chronic, unstable.  I recommended that the patient get started back on the previous medication she was taking.  We will start the patient on 10 mg for 2 weeks and increase to 20 mg for additional 2 weeks.  I did offer her psychology, she did turn this down.  I did explain to the patient that we will go ahead and closely watch her while we are restarting her on her medications.  - citalopram (CELEXA) 20 MG Tab; Take 0.5 Tablets by mouth every day for 14 days, THEN 1 Tablet  every day for 14 days.  Dispense: 21 Tablet; Refill: 0  - Patient has been identified as having a positive depression screening. Appropriate orders and counseling have been given.    2. Tiredness  Chronic, unstable.  Blood work was ordered in her previous visit, I did encourage her to complete her previous blood work.  I discussed with the patient's, we will go ahead and add on a thyroid level.  I did recommend that she start to increase her physical activity again as this may help to alleviate some of her fatigue and tiredness.  - TSH WITH REFLEX TO FT4; Future        Followup: Return in about 4 weeks (around 9/24/2021) for Follow-up.      Please note that this dictation was created using voice recognition software. I have made every reasonable attempt to correct obvious errors, but I expect that there are errors of grammar and possibly content that I did not discover before finalizing the note.

## 2021-09-08 ENCOUNTER — HOSPITAL ENCOUNTER (OUTPATIENT)
Dept: LAB | Facility: MEDICAL CENTER | Age: 41
End: 2021-09-08
Attending: FAMILY MEDICINE
Payer: COMMERCIAL

## 2021-09-08 DIAGNOSIS — Z83.49 FAMILY HISTORY OF HEMOCHROMATOSIS: ICD-10-CM

## 2021-09-08 DIAGNOSIS — Z13.220 ENCOUNTER FOR LIPID SCREENING FOR CARDIOVASCULAR DISEASE: ICD-10-CM

## 2021-09-08 DIAGNOSIS — Z13.0 SCREENING FOR DEFICIENCY ANEMIA: ICD-10-CM

## 2021-09-08 DIAGNOSIS — Z13.6 ENCOUNTER FOR LIPID SCREENING FOR CARDIOVASCULAR DISEASE: ICD-10-CM

## 2021-09-08 DIAGNOSIS — R53.83 TIREDNESS: ICD-10-CM

## 2021-09-08 DIAGNOSIS — E55.9 VITAMIN D INSUFFICIENCY: ICD-10-CM

## 2021-09-08 DIAGNOSIS — Z13.1 SCREENING FOR DIABETES MELLITUS: ICD-10-CM

## 2021-09-08 LAB
25(OH)D3 SERPL-MCNC: 42 NG/ML (ref 30–100)
ALBUMIN SERPL BCP-MCNC: 4 G/DL (ref 3.2–4.9)
ALBUMIN/GLOB SERPL: 1.5 G/DL
ALP SERPL-CCNC: 55 U/L (ref 30–99)
ALT SERPL-CCNC: 10 U/L (ref 2–50)
ANION GAP SERPL CALC-SCNC: 11 MMOL/L (ref 7–16)
AST SERPL-CCNC: 14 U/L (ref 12–45)
BASOPHILS # BLD AUTO: 0.5 % (ref 0–1.8)
BASOPHILS # BLD: 0.03 K/UL (ref 0–0.12)
BILIRUB SERPL-MCNC: 0.3 MG/DL (ref 0.1–1.5)
BUN SERPL-MCNC: 16 MG/DL (ref 8–22)
CALCIUM SERPL-MCNC: 8.4 MG/DL (ref 8.5–10.5)
CHLORIDE SERPL-SCNC: 105 MMOL/L (ref 96–112)
CHOLEST SERPL-MCNC: 188 MG/DL (ref 100–199)
CO2 SERPL-SCNC: 22 MMOL/L (ref 20–33)
CREAT SERPL-MCNC: 0.76 MG/DL (ref 0.5–1.4)
EOSINOPHIL # BLD AUTO: 0.15 K/UL (ref 0–0.51)
EOSINOPHIL NFR BLD: 2.5 % (ref 0–6.9)
ERYTHROCYTE [DISTWIDTH] IN BLOOD BY AUTOMATED COUNT: 41.1 FL (ref 35.9–50)
FASTING STATUS PATIENT QL REPORTED: NORMAL
FERRITIN SERPL-MCNC: 134 NG/ML (ref 10–291)
GLOBULIN SER CALC-MCNC: 2.6 G/DL (ref 1.9–3.5)
GLUCOSE SERPL-MCNC: 88 MG/DL (ref 65–99)
HCT VFR BLD AUTO: 41 % (ref 37–47)
HDLC SERPL-MCNC: 64 MG/DL
HGB BLD-MCNC: 13.8 G/DL (ref 12–16)
IMM GRANULOCYTES # BLD AUTO: 0.02 K/UL (ref 0–0.11)
IMM GRANULOCYTES NFR BLD AUTO: 0.3 % (ref 0–0.9)
IRON SATN MFR SERPL: 49 % (ref 15–55)
IRON SERPL-MCNC: 148 UG/DL (ref 40–170)
LDLC SERPL CALC-MCNC: 94 MG/DL
LYMPHOCYTES # BLD AUTO: 2.25 K/UL (ref 1–4.8)
LYMPHOCYTES NFR BLD: 38.1 % (ref 22–41)
MCH RBC QN AUTO: 31.7 PG (ref 27–33)
MCHC RBC AUTO-ENTMCNC: 33.7 G/DL (ref 33.6–35)
MCV RBC AUTO: 94 FL (ref 81.4–97.8)
MONOCYTES # BLD AUTO: 0.39 K/UL (ref 0–0.85)
MONOCYTES NFR BLD AUTO: 6.6 % (ref 0–13.4)
NEUTROPHILS # BLD AUTO: 3.07 K/UL (ref 2–7.15)
NEUTROPHILS NFR BLD: 52 % (ref 44–72)
NRBC # BLD AUTO: 0 K/UL
NRBC BLD-RTO: 0 /100 WBC
PLATELET # BLD AUTO: 254 K/UL (ref 164–446)
PMV BLD AUTO: 10.7 FL (ref 9–12.9)
POTASSIUM SERPL-SCNC: 4.2 MMOL/L (ref 3.6–5.5)
PROT SERPL-MCNC: 6.6 G/DL (ref 6–8.2)
RBC # BLD AUTO: 4.36 M/UL (ref 4.2–5.4)
SODIUM SERPL-SCNC: 138 MMOL/L (ref 135–145)
TIBC SERPL-MCNC: 300 UG/DL (ref 250–450)
TRIGL SERPL-MCNC: 149 MG/DL (ref 0–149)
TSH SERPL DL<=0.005 MIU/L-ACNC: 1.35 UIU/ML (ref 0.38–5.33)
UIBC SERPL-MCNC: 152 UG/DL (ref 110–370)
WBC # BLD AUTO: 5.9 K/UL (ref 4.8–10.8)

## 2021-09-08 PROCEDURE — 83550 IRON BINDING TEST: CPT

## 2021-09-08 PROCEDURE — 83540 ASSAY OF IRON: CPT

## 2021-09-08 PROCEDURE — 82728 ASSAY OF FERRITIN: CPT

## 2021-09-08 PROCEDURE — 82306 VITAMIN D 25 HYDROXY: CPT

## 2021-09-08 PROCEDURE — 80053 COMPREHEN METABOLIC PANEL: CPT

## 2021-09-08 PROCEDURE — 36415 COLL VENOUS BLD VENIPUNCTURE: CPT

## 2021-09-08 PROCEDURE — 85025 COMPLETE CBC W/AUTO DIFF WBC: CPT

## 2021-09-08 PROCEDURE — 80061 LIPID PANEL: CPT

## 2021-09-08 PROCEDURE — 84443 ASSAY THYROID STIM HORMONE: CPT

## 2021-09-28 ENCOUNTER — APPOINTMENT (OUTPATIENT)
Dept: MEDICAL GROUP | Facility: MEDICAL CENTER | Age: 41
End: 2021-09-28
Payer: COMMERCIAL

## 2021-10-01 ENCOUNTER — TELEMEDICINE (OUTPATIENT)
Dept: MEDICAL GROUP | Facility: MEDICAL CENTER | Age: 41
End: 2021-10-01
Payer: COMMERCIAL

## 2021-10-01 VITALS — HEIGHT: 63 IN | TEMPERATURE: 98.6 F | BODY MASS INDEX: 31.36 KG/M2 | WEIGHT: 177 LBS

## 2021-10-01 DIAGNOSIS — F33.1 MODERATE EPISODE OF RECURRENT MAJOR DEPRESSIVE DISORDER (HCC): ICD-10-CM

## 2021-10-01 DIAGNOSIS — R53.83 TIREDNESS: ICD-10-CM

## 2021-10-01 DIAGNOSIS — E55.9 VITAMIN D INSUFFICIENCY: ICD-10-CM

## 2021-10-01 PROCEDURE — 99214 OFFICE O/P EST MOD 30 MIN: CPT | Mod: 95 | Performed by: FAMILY MEDICINE

## 2021-10-01 ASSESSMENT — ENCOUNTER SYMPTOMS
DEPRESSION: 0
CHILLS: 0
WEAKNESS: 0
MYALGIAS: 0
SHORTNESS OF BREATH: 0
COUGH: 0
FEVER: 0
DIZZINESS: 0
ABDOMINAL PAIN: 0
PALPITATIONS: 0
WEIGHT LOSS: 0

## 2021-10-01 ASSESSMENT — FIBROSIS 4 INDEX: FIB4 SCORE: 0.7

## 2021-10-01 NOTE — ASSESSMENT & PLAN NOTE
Patient reports that she continues to feel fatigue despite blood work coming back as normal.  Patient is unsure of where to go from here in regards to overall feeling of tiredness.

## 2021-10-01 NOTE — ASSESSMENT & PLAN NOTE
Patient is requesting a refill of her citalopram 20 mg daily, but requesting to have the medication refilled sometime next week as she is unable to pick it up currently.  She reports that the medication has been very beneficial for her and overall her depression and anxiety have improved.

## 2021-10-01 NOTE — PROGRESS NOTES
Telemedicine Visit: Established Patient     This encounter was conducted via Zoom.   Verbal consent was obtained. Patient's identity was verified.  Patient is calling from [place]    Subjective:   CC: Lab follow-up, medication refill  Sarah Orozco is a 41 y.o. female presenting for evaluation and management of:    Major depressive disorder  Patient is requesting a refill of her citalopram 20 mg daily, but requesting to have the medication refilled sometime next week as she is unable to pick it up currently.  She reports that the medication has been very beneficial for her and overall her depression and anxiety have improved.    Vitamin D insufficiency  Patient's recent blood work shows a normal vitamin D level.    Tiredness  Patient reports that she continues to feel fatigue despite blood work coming back as normal.  Patient is unsure of where to go from here in regards to overall feeling of tiredness.       Review of Systems   Constitutional: Positive for malaise/fatigue. Negative for chills, fever and weight loss.   Respiratory: Negative for cough and shortness of breath.    Cardiovascular: Negative for chest pain and palpitations.   Gastrointestinal: Negative for abdominal pain.   Genitourinary: Negative.    Musculoskeletal: Negative for myalgias.   Skin: Negative for rash.   Neurological: Negative for dizziness and weakness.   Psychiatric/Behavioral: Negative for depression.        No Known Allergies    Current medicines (including changes today)  Current Outpatient Medications   Medication Sig Dispense Refill   • citalopram (CELEXA) 20 MG Tab Take 1 Tablet by mouth every day. 90 Tablet 3   • naproxen (NAPROSYN) 500 MG Tab      • fluticasone (FLONASE) 50 MCG/ACT nasal spray      • FLOVENT  MCG/ACT Aerosol      • LO LOESTRIN FE 1 MG-10 MCG / 10 MCG Tab      • Fexofenadine HCl (ALLEGRA PO) Take  by mouth.     • montelukast (SINGULAIR) 10 MG Tab Take 1 tablet by mouth every day. 30 tablet 11   •  "Ibuprofen (ADVIL PO) Take  by mouth.     • albuterol 108 (90 BASE) MCG/ACT Aero Soln inhalation aerosol Inhale 2 Puffs by mouth every 6 hours as needed for Shortness of Breath.       No current facility-administered medications for this visit.       Patient Active Problem List    Diagnosis Date Noted   • Tiredness 2021   • Major depressive disorder 2021   • Obesity (BMI 30-39.9) 2021   • Moderate persistent asthma without complication 06/15/2021   • Vitamin D insufficiency 06/15/2021   • Family history of hemochromatosis 06/15/2021       Family History   Problem Relation Age of Onset   • Heart Disease Father         Father had CABG at 53, after abnl stress and ECG   • Hyperlipidemia Father    • Heart Disease Paternal Grandmother         CABG at 49,  in her sleep in 70s   • Other Mother         Liver failure after gallbladder surgery, 61   • Alcohol abuse Sister         possible   • Drug abuse Brother    • Other Maternal Uncle         hemochromotosis   • Parkinson's Disease Maternal Grandmother    • Stroke Maternal Grandfather    • Other Paternal Grandfather         possible brain annuyr   • Drug abuse Sister    • Allergies Son    • No Known Problems Daughter        She  has a past medical history of Asthma.  She  has a past surgical history that includes dental extraction(s).       Objective:   Temp 37 °C (98.6 °F)   Ht 1.6 m (5' 3\")   Wt 80.3 kg (177 lb)   BMI 31.35 kg/m²     Physical Exam:  Constitutional: Alert, no distress, well-groomed.  Skin: No rashes in visible areas.  Eye: Round. Conjunctiva clear, lids normal. No icterus.   ENMT: Lips pink without lesions, good dentition, moist mucous membranes. Phonation normal.  Neck: No masses, no thyromegaly. Moves freely without pain.  CV: Pulse as reported by patient  Respiratory: Unlabored respiratory effort, no cough or audible wheeze  Psych: Alert and oriented x3, normal affect and mood.     Labs:  Reviewed and discussed with " pt.    Assessment and Plan:   The following treatment plan was discussed:     1. Moderate episode of recurrent major depressive disorder (HCC)  Chronic, stable.  I discussed with the patient that when she is ready to have her medication refilled she can send me a [employer] message requesting for that refill.  I did recommend that she continue to take this medication as its been very beneficial for her.    2. Vitamin D insufficiency  Chronic, stable.  I recommended that the patient continue with her current medication regime or time spent outside as its helping to stabilize her overall vitamin D level.    3. Tiredness  Chronic, unstable.  I discussed with the patient that her tiredness/fatigue is not related to a thyroid dysfunction nor low vitamin D levels.  I recommended that the patient ensure that she is getting a full night sleep, with plenty of rest.  I also recommended that she make some healthy lifestyle changes such as improving her overall diet with increasing fruits and vegetables, increasing physical activity up to 140 minutes a week of moderate to high intensity activity.  I also discussed with the patient that anxiety and depression, left uncontrolled can also cause increase in fatigue symptoms.  I recommended that the patient continue to make positive lifestyle changes.         Follow-up: Return in about 1 year (around 10/1/2022), or if symptoms worsen or fail to improve, for Annual.    Advised to set up an appointment with me sooner if symptoms continue to worsen and/or do not improve.  Discussed with the patient on when to seek out treatment at the emergency department.

## 2022-02-04 DIAGNOSIS — J45.40 MODERATE PERSISTENT ASTHMA WITHOUT COMPLICATION: ICD-10-CM

## 2022-02-04 RX ORDER — MONTELUKAST SODIUM 10 MG/1
10 TABLET ORAL DAILY
Qty: 90 TABLET | Refills: 3 | Status: SHIPPED | OUTPATIENT
Start: 2022-02-04 | End: 2023-05-30

## 2022-04-15 ENCOUNTER — HOSPITAL ENCOUNTER (OUTPATIENT)
Facility: MEDICAL CENTER | Age: 42
End: 2022-04-15
Attending: FAMILY MEDICINE
Payer: COMMERCIAL

## 2022-04-15 ENCOUNTER — OFFICE VISIT (OUTPATIENT)
Dept: MEDICAL GROUP | Facility: MEDICAL CENTER | Age: 42
End: 2022-04-15
Payer: COMMERCIAL

## 2022-04-15 VITALS
HEIGHT: 63 IN | DIASTOLIC BLOOD PRESSURE: 76 MMHG | BODY MASS INDEX: 34.49 KG/M2 | OXYGEN SATURATION: 93 % | TEMPERATURE: 97.6 F | SYSTOLIC BLOOD PRESSURE: 130 MMHG | HEART RATE: 87 BPM | WEIGHT: 194.67 LBS

## 2022-04-15 DIAGNOSIS — R11.0 NAUSEA: ICD-10-CM

## 2022-04-15 DIAGNOSIS — N30.01 ACUTE CYSTITIS WITH HEMATURIA: ICD-10-CM

## 2022-04-15 PROBLEM — U07.1 COVID-19: Status: ACTIVE | Noted: 2022-04-15

## 2022-04-15 LAB
APPEARANCE UR: NORMAL
BILIRUB UR STRIP-MCNC: NORMAL MG/DL
COLOR UR AUTO: NORMAL
GLUCOSE UR STRIP.AUTO-MCNC: NORMAL MG/DL
KETONES UR STRIP.AUTO-MCNC: NORMAL MG/DL
LEUKOCYTE ESTERASE UR QL STRIP.AUTO: NORMAL
NITRITE UR QL STRIP.AUTO: NORMAL
PH UR STRIP.AUTO: 6 [PH] (ref 5–8)
PROT UR QL STRIP: NORMAL MG/DL
RBC UR QL AUTO: NORMAL
SP GR UR STRIP.AUTO: 1.02
UROBILINOGEN UR STRIP-MCNC: 1 MG/DL

## 2022-04-15 PROCEDURE — 81002 URINALYSIS NONAUTO W/O SCOPE: CPT | Performed by: FAMILY MEDICINE

## 2022-04-15 PROCEDURE — 99213 OFFICE O/P EST LOW 20 MIN: CPT | Performed by: FAMILY MEDICINE

## 2022-04-15 PROCEDURE — 87086 URINE CULTURE/COLONY COUNT: CPT

## 2022-04-15 RX ORDER — ONDANSETRON 4 MG/1
4 TABLET, ORALLY DISINTEGRATING ORAL EVERY 6 HOURS PRN
Qty: 10 TABLET | Refills: 0 | Status: SHIPPED | OUTPATIENT
Start: 2022-04-15 | End: 2023-03-02

## 2022-04-15 RX ORDER — PHENAZOPYRIDINE HYDROCHLORIDE 200 MG/1
200 TABLET, FILM COATED ORAL 3 TIMES DAILY PRN
Qty: 6 TABLET | Refills: 0 | Status: SHIPPED | OUTPATIENT
Start: 2022-04-15 | End: 2023-03-02

## 2022-04-15 RX ORDER — NITROFURANTOIN 25; 75 MG/1; MG/1
100 CAPSULE ORAL 2 TIMES DAILY
Qty: 10 CAPSULE | Refills: 0 | Status: SHIPPED | OUTPATIENT
Start: 2022-04-15 | End: 2022-04-20

## 2022-04-15 ASSESSMENT — ENCOUNTER SYMPTOMS
COUGH: 0
CHILLS: 0
DIARRHEA: 0
NAUSEA: 1
WEAKNESS: 0
BLURRED VISION: 0
DIZZINESS: 0
WEIGHT LOSS: 0
SORE THROAT: 0
BACK PAIN: 1
ABDOMINAL PAIN: 1
PALPITATIONS: 0
NERVOUS/ANXIOUS: 0
SHORTNESS OF BREATH: 0
BRUISES/BLEEDS EASILY: 0
DOUBLE VISION: 0
DEPRESSION: 0
VOMITING: 0
MYALGIAS: 0
TINGLING: 0
FEVER: 0
CONSTIPATION: 0

## 2022-04-15 ASSESSMENT — FIBROSIS 4 INDEX: FIB4 SCORE: 0.7

## 2022-04-15 NOTE — PROGRESS NOTES
Sarah Orozco is a pleasant 41 y.o. female here for   Chief Complaint   Patient presents with   • Coronavirus Screening      HPI:   Problem   Acute Cystitis With Hematuria    Recent COVID infection with symptoms onset on 03/27 from at home test.  Continues to feel like she has SOB, felt that she was getting better.  Increase in nausea, fatigue and low back pain over the last couple of days.  Denies urgency, frequency.  + hx of kidney stones  One episode of diarrhea symptoms yesterday, took Pepto-Bismol.  Has not had any diarrhea since that time.          Current Medicines (including changes today)  Current Outpatient Medications   Medication Sig Dispense Refill   • ondansetron (ZOFRAN ODT) 4 MG TABLET DISPERSIBLE Take 1 Tablet by mouth every 6 hours as needed for Nausea. 10 Tablet 0   • nitrofurantoin (MACROBID) 100 MG Cap Take 1 Capsule by mouth 2 times a day for 5 days. 10 Capsule 0   • phenazopyridine (PYRIDIUM) 200 MG Tab Take 1 Tablet by mouth 3 times a day as needed for Moderate Pain. 6 Tablet 0   • montelukast (SINGULAIR) 10 MG Tab TAKE 1 TABLET BY MOUTH EVERY DAY 90 Tablet 3   • citalopram (CELEXA) 20 MG Tab Take 1 Tablet by mouth every day. 90 Tablet 3   • naproxen (NAPROSYN) 500 MG Tab      • fluticasone (FLONASE) 50 MCG/ACT nasal spray      • FLOVENT  MCG/ACT Aerosol      • LO LOESTRIN FE 1 MG-10 MCG / 10 MCG Tab      • Fexofenadine HCl (ALLEGRA PO) Take  by mouth.     • Ibuprofen (ADVIL PO) Take  by mouth.     • albuterol 108 (90 BASE) MCG/ACT Aero Soln inhalation aerosol Inhale 2 Puffs by mouth every 6 hours as needed for Shortness of Breath.       No current facility-administered medications for this visit.     Past Medical/ Surgical History  She  has a past medical history of Asthma.  She  has a past surgical history that includes dental extraction(s).    Review of Systems   Constitutional: Positive for malaise/fatigue. Negative for chills, fever and weight loss.   HENT: Negative for  "hearing loss and sore throat.    Eyes: Negative for blurred vision and double vision.   Respiratory: Negative for cough and shortness of breath.    Cardiovascular: Negative for chest pain, palpitations and leg swelling.   Gastrointestinal: Positive for abdominal pain (Right low and left quadrant abdominal pain) and nausea. Negative for constipation, diarrhea and vomiting.   Genitourinary: Negative.    Musculoskeletal: Positive for back pain (Low back discomfort). Negative for myalgias.   Skin: Negative for rash.   Neurological: Negative for dizziness, tingling and weakness.   Endo/Heme/Allergies: Does not bruise/bleed easily.   Psychiatric/Behavioral: Negative for depression. The patient is not nervous/anxious.          Objective:     /76 (BP Location: Left arm, Patient Position: Sitting, BP Cuff Size: Adult)   Pulse 87   Temp 36.4 °C (97.6 °F) (Temporal)   Ht 1.6 m (5' 3\")   Wt 88.3 kg (194 lb 10.7 oz)   SpO2 93%  Body mass index is 34.48 kg/m².    Physical Exam  Constitutional:       General: She is not in acute distress.  HENT:      Head: Normocephalic and atraumatic.      Right Ear: External ear normal.      Left Ear: External ear normal.   Eyes:      General: Lids are normal.      Extraocular Movements: Extraocular movements intact.      Conjunctiva/sclera: Conjunctivae normal.      Pupils: Pupils are equal, round, and reactive to light.   Neck:      Trachea: Trachea normal.   Cardiovascular:      Rate and Rhythm: Normal rate and regular rhythm.      Heart sounds: Normal heart sounds. No murmur heard.    No friction rub. No gallop.   Pulmonary:      Effort: Pulmonary effort is normal. No accessory muscle usage.      Breath sounds: Normal breath sounds. No wheezing or rales.   Abdominal:      General: Bowel sounds are normal.      Palpations: Abdomen is soft.      Tenderness: There is abdominal tenderness in the right lower quadrant, suprapubic area and left lower quadrant. There is no right CVA " tenderness or left CVA tenderness.   Musculoskeletal:      Cervical back: Normal range of motion and neck supple.      Right lower leg: No edema.      Left lower leg: No edema.   Lymphadenopathy:      Cervical: No cervical adenopathy.   Skin:     General: Skin is warm and dry.      Findings: No rash.   Neurological:      General: No focal deficit present.      Mental Status: She is alert and oriented to person, place, and time.      GCS: GCS eye subscore is 4. GCS verbal subscore is 5. GCS motor subscore is 6.      Gait: Gait is intact.      Deep Tendon Reflexes:      Reflex Scores:       Brachioradialis reflexes are 2+ on the right side and 2+ on the left side.       Patellar reflexes are 2+ on the right side and 2+ on the left side.  Psychiatric:         Attention and Perception: Attention normal.         Mood and Affect: Affect normal.         Speech: Speech normal.        Imaging:  No recent imaging to review    Labs  Lab Results   Component Value Date/Time    POCCOLOR other 04/15/2022 11:17 AM    POCAPPEAR slighty cloudy 04/15/2022 11:17 AM    POCLEUKEST small 04/15/2022 11:17 AM    POCNITRITE neg 04/15/2022 11:17 AM    POCUROBILIGE 1.0 04/15/2022 11:17 AM    POCPROTEIN neg 04/15/2022 11:17 AM    POCURPH 6.0 04/15/2022 11:17 AM    POCBLOOD trace-intact 04/15/2022 11:17 AM    POCSPGRV 1.025 04/15/2022 11:17 AM    POCKETONES neg 04/15/2022 11:17 AM    POCBILIRUBIN neg 04/15/2022 11:17 AM    POCGLUCUA neg 04/15/2022 11:17 AM        Assessment and Plan:   The following treatment plan was discussed    Problem List Items Addressed This Visit     Acute cystitis with hematuria     Acute.  Concerned for UTI based upon patient's symptoms.  Physical exam revealed increase in tenderness to bilateral lower abdomen.  Denies CVA tenderness.  POCT UA does reveal small leukocyte esterase with trace blood but intact  Based upon patient's symptoms and positive leukocyte esterase we will go ahead and treat her for UTI.  Macrobid  sent to her preferred pharmacy along with Pyridium to help with her symptoms.  Zofran prescription given to the patient for nausea secondary to infection.  Urine sent for culture  Patient provided with ER precautions.         Relevant Medications    nitrofurantoin (MACROBID) 100 MG Cap    phenazopyridine (PYRIDIUM) 200 MG Tab      Other Visit Diagnoses     Nausea        Relevant Medications    ondansetron (ZOFRAN ODT) 4 MG TABLET DISPERSIBLE    Other Relevant Orders    POCT Urinalysis (Completed)    URINE CULTURE(NEW)           Followup: Return in about 2 weeks (around 4/29/2022) for If not feeling better.    I have placed POCT urinalysis orders.  The MA is preforming POCT urinalysis orders under the direction of Dr. Walden    Please note that this dictation was created using voice recognition software. I have made every reasonable attempt to correct obvious errors, but I expect that there are errors of grammar and possibly content that I did not discover before finalizing the note.

## 2022-04-15 NOTE — ASSESSMENT & PLAN NOTE
Acute.  Concerned for UTI based upon patient's symptoms.  Physical exam revealed increase in tenderness to bilateral lower abdomen.  Denies CVA tenderness.  POCT UA does reveal small leukocyte esterase with trace blood but intact  Based upon patient's symptoms and positive leukocyte esterase we will go ahead and treat her for UTI.  Macrobid sent to her preferred pharmacy along with Pyridium to help with her symptoms.  Zofran prescription given to the patient for nausea secondary to infection.  Urine sent for culture  Patient provided with ER precautions.

## 2022-04-15 NOTE — PATIENT INSTRUCTIONS
Urinary Tract Infection, Adult    A urinary tract infection (UTI) is an infection of any part of the urinary tract. The urinary tract includes the kidneys, ureters, bladder, and urethra. These organs make, store, and get rid of urine in the body.  Your health care provider may use other names to describe the infection. An upper UTI affects the ureters and kidneys (pyelonephritis). A lower UTI affects the bladder (cystitis) and urethra (urethritis).  What are the causes?  Most urinary tract infections are caused by bacteria in your genital area, around the entrance to your urinary tract (urethra). These bacteria grow and cause inflammation of your urinary tract.  What increases the risk?  You are more likely to develop this condition if:  · You have a urinary catheter that stays in place (indwelling).  · You are not able to control when you urinate or have a bowel movement (you have incontinence).  · You are female and you:  ? Use a spermicide or diaphragm for birth control.  ? Have low estrogen levels.  ? Are pregnant.  · You have certain genes that increase your risk (genetics).  · You are sexually active.  · You take antibiotic medicines.  · You have a condition that causes your flow of urine to slow down, such as:  ? An enlarged prostate, if you are male.  ? Blockage in your urethra (stricture).  ? A kidney stone.  ? A nerve condition that affects your bladder control (neurogenic bladder).  ? Not getting enough to drink, or not urinating often.  · You have certain medical conditions, such as:  ? Diabetes.  ? A weak disease-fighting system (immunesystem).  ? Sickle cell disease.  ? Gout.  ? Spinal cord injury.  What are the signs or symptoms?  Symptoms of this condition include:  · Needing to urinate right away (urgently).  · Frequent urination or passing small amounts of urine frequently.  · Pain or burning with urination.  · Blood in the urine.  · Urine that smells bad or unusual.  · Trouble urinating.  · Cloudy  urine.  · Vaginal discharge, if you are female.  · Pain in the abdomen or the lower back.  You may also have:  · Vomiting or a decreased appetite.  · Confusion.  · Irritability or tiredness.  · A fever.  · Diarrhea.  The first symptom in older adults may be confusion. In some cases, they may not have any symptoms until the infection has worsened.  How is this diagnosed?  This condition is diagnosed based on your medical history and a physical exam. You may also have other tests, including:  · Urine tests.  · Blood tests.  · Tests for sexually transmitted infections (STIs).  If you have had more than one UTI, a cystoscopy or imaging studies may be done to determine the cause of the infections.  How is this treated?  Treatment for this condition includes:  · Antibiotic medicine.  · Over-the-counter medicines to treat discomfort.  · Drinking enough water to stay hydrated.  If you have frequent infections or have other conditions such as a kidney stone, you may need to see a health care provider who specializes in the urinary tract (urologist).  In rare cases, urinary tract infections can cause sepsis. Sepsis is a life-threatening condition that occurs when the body responds to an infection. Sepsis is treated in the hospital with IV antibiotics, fluids, and other medicines.  Follow these instructions at home:    Medicines  · Take over-the-counter and prescription medicines only as told by your health care provider.  · If you were prescribed an antibiotic medicine, take it as told by your health care provider. Do not stop using the antibiotic even if you start to feel better.  General instructions  · Make sure you:  ? Empty your bladder often and completely. Do not hold urine for long periods of time.  ? Empty your bladder after sex.  ? Wipe from front to back after a bowel movement if you are female. Use each tissue one time when you wipe.  · Drink enough fluid to keep your urine pale yellow.  · Keep all follow-up  visits as told by your health care provider. This is important.  Contact a health care provider if:  · Your symptoms do not get better after 1-2 days.  · Your symptoms go away and then return.  Get help right away if you have:  · Severe pain in your back or your lower abdomen.  · A fever.  · Nausea or vomiting.  Summary  · A urinary tract infection (UTI) is an infection of any part of the urinary tract, which includes the kidneys, ureters, bladder, and urethra.  · Most urinary tract infections are caused by bacteria in your genital area, around the entrance to your urinary tract (urethra).  · Treatment for this condition often includes antibiotic medicines.  · If you were prescribed an antibiotic medicine, take it as told by your health care provider. Do not stop using the antibiotic even if you start to feel better.  · Keep all follow-up visits as told by your health care provider. This is important.  This information is not intended to replace advice given to you by your health care provider. Make sure you discuss any questions you have with your health care provider.  Document Released: 09/27/2006 Document Revised: 12/05/2019 Document Reviewed: 06/27/2019  [employer] Patient Education © 2020 [employer]

## 2022-04-18 LAB
BACTERIA UR CULT: NORMAL
SIGNIFICANT IND 70042: NORMAL
SITE SITE: NORMAL
SOURCE SOURCE: NORMAL

## 2022-04-19 ENCOUNTER — OFFICE VISIT (OUTPATIENT)
Dept: MEDICAL GROUP | Facility: MEDICAL CENTER | Age: 42
End: 2022-04-19
Payer: COMMERCIAL

## 2022-04-19 VITALS
WEIGHT: 192.2 LBS | HEART RATE: 81 BPM | HEIGHT: 63 IN | DIASTOLIC BLOOD PRESSURE: 68 MMHG | TEMPERATURE: 97.6 F | SYSTOLIC BLOOD PRESSURE: 126 MMHG | BODY MASS INDEX: 34.05 KG/M2 | OXYGEN SATURATION: 97 %

## 2022-04-19 DIAGNOSIS — R19.7 DIARRHEA OF PRESUMED INFECTIOUS ORIGIN: ICD-10-CM

## 2022-04-19 DIAGNOSIS — R11.0 NAUSEA: ICD-10-CM

## 2022-04-19 DIAGNOSIS — Z13.220 ENCOUNTER FOR LIPID SCREENING FOR CARDIOVASCULAR DISEASE: ICD-10-CM

## 2022-04-19 DIAGNOSIS — Z13.1 SCREENING FOR DIABETES MELLITUS: ICD-10-CM

## 2022-04-19 DIAGNOSIS — Z13.6 ENCOUNTER FOR LIPID SCREENING FOR CARDIOVASCULAR DISEASE: ICD-10-CM

## 2022-04-19 DIAGNOSIS — Z83.49 FAMILY HISTORY OF HEMOCHROMATOSIS: ICD-10-CM

## 2022-04-19 PROBLEM — A09 DIARRHEA OF INFECTIOUS ORIGIN: Status: ACTIVE | Noted: 2022-04-19

## 2022-04-19 PROBLEM — N30.01 ACUTE CYSTITIS WITH HEMATURIA: Status: RESOLVED | Noted: 2022-04-15 | Resolved: 2022-04-19

## 2022-04-19 PROCEDURE — 99213 OFFICE O/P EST LOW 20 MIN: CPT | Performed by: FAMILY MEDICINE

## 2022-04-19 ASSESSMENT — FIBROSIS 4 INDEX: FIB4 SCORE: 0.7

## 2022-04-19 ASSESSMENT — ENCOUNTER SYMPTOMS
PALPITATIONS: 0
ABDOMINAL PAIN: 1
HEARTBURN: 1
VOMITING: 0
SHORTNESS OF BREATH: 0
BLOOD IN STOOL: 0
COUGH: 0
FEVER: 0
DIARRHEA: 1
CHILLS: 0
NAUSEA: 1
WEIGHT LOSS: 0
WEAKNESS: 0
MYALGIAS: 0
DEPRESSION: 0
DIZZINESS: 0

## 2022-04-19 ASSESSMENT — PATIENT HEALTH QUESTIONNAIRE - PHQ9: CLINICAL INTERPRETATION OF PHQ2 SCORE: 0

## 2022-04-19 NOTE — ASSESSMENT & PLAN NOTE
Acute.  Suspect possible gastritis.  Recommended keeping herself nice and hydrated with electrolyte replacement, Pedialyte.  Discussed use of Imodium if diarrhea returns.

## 2022-04-19 NOTE — ASSESSMENT & PLAN NOTE
Acute.  Suspect possible gastritis.  Recommended to continue to use the Zofran to help with any nausea symptoms.  Advised to drink water or electrolyte beverages such as Pedialyte.  Advised to initiate the brat diet today she is starting to feel little bit better.  If no improvement in her symptoms or if her symptoms worsen the next day or 2 I recommend that we try some labs.

## 2022-04-19 NOTE — PATIENT INSTRUCTIONS
Take prilosec 30 mins prior to the first meal of the day.  Drink lots of fluids, as diarrhea can increase your chances of having dehydration.  Drink lots of water he can also try Pedialyte.

## 2022-04-19 NOTE — PROGRESS NOTES
Sarah Orozco is a pleasant 41 y.o. female here for   Chief Complaint   Patient presents with   • Follow-Up   • Heartburn     Not eating well, but still having heartburn    • GI Problem      HPI:   Problem   Nausea    Recent COVID infection with symptoms onset on 03/27 from at home test.  Continues to feel like she has SOB, felt that she was getting better.  Increase in nausea, fatigue and low back pain over the last couple of days.  Denies urgency, frequency.  + hx of kidney stones  One episode of diarrhea symptoms yesterday, took Pepto-Bismol.  Has not had any diarrhea since that time.  Previous visit in the clinic had positive CVA tenderness with a UA positive for leukocyte esterase and blood.  Urine culture came back negative.  Continues to experience nausea with bouts of diarrhea.  Reports that she does feel better today though she has also been reporting increase in acid reflux symptoms.  Not sure why as she has not had a appetite in the last couple of days.  Use Zofran only a couple of times, has some leftover at home.       Diarrhea of Presumed Infectious Origin    Had multiple bouts of diarrhea starting on Thursday and through the weekend, denies any symptoms today.  Reports feeling slightly better today than she has the last several days.  Is been trying to drink plenty of water to keep her self hydrated.     Acute Cystitis With Hematuria (Resolved)        Health Maintenance  Immunization: Patient is due for pneumococcal vaccine.  Mammogram: Patient is due    Current Medicines (including changes today)  Current Outpatient Medications   Medication Sig Dispense Refill   • ondansetron (ZOFRAN ODT) 4 MG TABLET DISPERSIBLE Take 1 Tablet by mouth every 6 hours as needed for Nausea. 10 Tablet 0   • nitrofurantoin (MACROBID) 100 MG Cap Take 1 Capsule by mouth 2 times a day for 5 days. 10 Capsule 0   • phenazopyridine (PYRIDIUM) 200 MG Tab Take 1 Tablet by mouth 3 times a day as needed for Moderate Pain. 6  "Tablet 0   • montelukast (SINGULAIR) 10 MG Tab TAKE 1 TABLET BY MOUTH EVERY DAY 90 Tablet 3   • citalopram (CELEXA) 20 MG Tab Take 1 Tablet by mouth every day. 90 Tablet 3   • naproxen (NAPROSYN) 500 MG Tab      • fluticasone (FLONASE) 50 MCG/ACT nasal spray      • FLOVENT  MCG/ACT Aerosol      • LO LOESTRIN FE 1 MG-10 MCG / 10 MCG Tab      • Fexofenadine HCl (ALLEGRA PO) Take  by mouth.     • Ibuprofen (ADVIL PO) Take  by mouth.     • albuterol 108 (90 BASE) MCG/ACT Aero Soln inhalation aerosol Inhale 2 Puffs by mouth every 6 hours as needed for Shortness of Breath.       No current facility-administered medications for this visit.     Past Medical/ Surgical History  She  has a past medical history of Asthma.  She  has a past surgical history that includes dental extraction(s).    Review of Systems   Constitutional: Negative for chills, fever, malaise/fatigue and weight loss.   Respiratory: Negative for cough and shortness of breath.    Cardiovascular: Negative for chest pain and palpitations.   Gastrointestinal: Positive for abdominal pain, diarrhea, heartburn and nausea. Negative for blood in stool and vomiting.   Genitourinary: Negative.    Musculoskeletal: Negative for myalgias.   Skin: Negative for rash.   Neurological: Negative for dizziness and weakness.   Psychiatric/Behavioral: Negative for depression.         Objective:     /68 (BP Location: Left arm, Patient Position: Sitting, BP Cuff Size: Adult)   Pulse 81   Temp 36.4 °C (97.6 °F) (Temporal)   Ht 1.6 m (5' 3\")   Wt 87.2 kg (192 lb 3.2 oz)   SpO2 97%  Body mass index is 34.05 kg/m².    Physical Exam  Constitutional:       General: She is not in acute distress.  HENT:      Head: Normocephalic and atraumatic.   Eyes:      Conjunctiva/sclera: Conjunctivae normal.      Pupils: Pupils are equal, round, and reactive to light.   Pulmonary:      Effort: Pulmonary effort is normal. No respiratory distress.   Abdominal:      General: There is no " distension.   Musculoskeletal:      Cervical back: Normal range of motion and neck supple.   Skin:     General: Skin is warm and dry.      Findings: No rash.   Neurological:      Mental Status: She is alert and oriented to person, place, and time.      Gait: Gait is intact.   Psychiatric:         Mood and Affect: Affect normal.        Imaging:  No recent imaging to review    Labs  No pertinent labs to review  Assessment and Plan:   The following treatment plan was discussed    Problem List Items Addressed This Visit     Family history of hemochromatosis    Relevant Orders    CBC WITH DIFFERENTIAL    IRON/TOTAL IRON BIND    FERRITIN    Nausea     Acute.  Suspect possible gastritis.  Recommended to continue to use the Zofran to help with any nausea symptoms.  Advised to drink water or electrolyte beverages such as Pedialyte.  Advised to initiate the brat diet today she is starting to feel little bit better.  If no improvement in her symptoms or if her symptoms worsen the next day or 2 I recommend that we try some labs.         Diarrhea of presumed infectious origin     Acute.  Suspect possible gastritis.  Recommended keeping herself nice and hydrated with electrolyte replacement, Pedialyte.  Discussed use of Imodium if diarrhea returns.           Other Visit Diagnoses     Screening for diabetes mellitus        Relevant Orders    Comp Metabolic Panel    ESTIMATED GFR    Encounter for lipid screening for cardiovascular disease        Relevant Orders    Lipid Profile           Followup: Return in about 5 months (around 9/19/2022), or if symptoms worsen or fail to improve, for Annual follow-up and labs.      Please note that this dictation was created using voice recognition software. I have made every reasonable attempt to correct obvious errors, but I expect that there are errors of grammar and possibly content that I did not discover before finalizing the note.

## 2022-06-28 ENCOUNTER — HOSPITAL ENCOUNTER (OUTPATIENT)
Dept: RADIOLOGY | Facility: MEDICAL CENTER | Age: 42
End: 2022-06-28
Attending: FAMILY MEDICINE
Payer: COMMERCIAL

## 2022-06-28 ENCOUNTER — OFFICE VISIT (OUTPATIENT)
Dept: MEDICAL GROUP | Facility: MEDICAL CENTER | Age: 42
End: 2022-06-28
Payer: COMMERCIAL

## 2022-06-28 VITALS
DIASTOLIC BLOOD PRESSURE: 76 MMHG | TEMPERATURE: 97.3 F | BODY MASS INDEX: 34.23 KG/M2 | WEIGHT: 193.2 LBS | OXYGEN SATURATION: 98 % | HEIGHT: 63 IN | HEART RATE: 78 BPM | SYSTOLIC BLOOD PRESSURE: 132 MMHG

## 2022-06-28 DIAGNOSIS — M79.631 RIGHT FOREARM PAIN: ICD-10-CM

## 2022-06-28 PROCEDURE — 73090 X-RAY EXAM OF FOREARM: CPT | Mod: RT

## 2022-06-28 PROCEDURE — 99213 OFFICE O/P EST LOW 20 MIN: CPT | Performed by: FAMILY MEDICINE

## 2022-06-28 RX ORDER — IBUPROFEN 200 MG
600 TABLET ORAL ONCE
Status: COMPLETED | OUTPATIENT
Start: 2022-06-28 | End: 2022-06-28

## 2022-06-28 RX ADMIN — Medication 600 MG: at 08:34

## 2022-06-28 ASSESSMENT — ENCOUNTER SYMPTOMS
CHILLS: 0
FEVER: 0
SHORTNESS OF BREATH: 0
WEAKNESS: 0
DEPRESSION: 0
COUGH: 0
PALPITATIONS: 0
MYALGIAS: 0
DIZZINESS: 0
WEIGHT LOSS: 0
ABDOMINAL PAIN: 0

## 2022-06-28 ASSESSMENT — FIBROSIS 4 INDEX: FIB4 SCORE: 0.71

## 2022-06-28 NOTE — ASSESSMENT & PLAN NOTE
Acute.  Palpated ulnar, elbow joint, and wrist joints without any discomfort.  Pinpoint tenderness to radius midway between elbow and thumb.  No significant swelling or bruising noted.  Imaging ordered.  Advised sling and splint to aid in recovery, can be located at any local pharmacy.  Ibuprofen 600 mg given in clinic for pain.

## 2022-06-28 NOTE — PROGRESS NOTES
Sarah Orozco is a pleasant 42 y.o. female here for   Chief Complaint   Patient presents with   • Arm Injury     Pt fell while playing with dog and fell on right hand       HPI:   Problem   Right Forearm Pain    Fell last night on her right forearm while playing with her dogs in the kitchen.  Fell with her right hand out to catch her fall.  Started to develop pain to her right lateral forearm.  Pain spread up to her right elbow down to her right wrist proximal to her thumb.  Increase in pain with wrist movement pronation, supination, extension, and flexion.  Notes mild swelling last night, iced and tylenol which helped with the discomfort.  Woke up early this morning with a lot of pain.            Current Medicines (including changes today)  Current Outpatient Medications   Medication Sig Dispense Refill   • ondansetron (ZOFRAN ODT) 4 MG TABLET DISPERSIBLE Take 1 Tablet by mouth every 6 hours as needed for Nausea. 10 Tablet 0   • phenazopyridine (PYRIDIUM) 200 MG Tab Take 1 Tablet by mouth 3 times a day as needed for Moderate Pain. 6 Tablet 0   • montelukast (SINGULAIR) 10 MG Tab TAKE 1 TABLET BY MOUTH EVERY DAY 90 Tablet 3   • citalopram (CELEXA) 20 MG Tab Take 1 Tablet by mouth every day. 90 Tablet 3   • naproxen (NAPROSYN) 500 MG Tab      • fluticasone (FLONASE) 50 MCG/ACT nasal spray      • FLOVENT  MCG/ACT Aerosol      • LO LOESTRIN FE 1 MG-10 MCG / 10 MCG Tab      • Fexofenadine HCl (ALLEGRA PO) Take  by mouth.     • Ibuprofen (ADVIL PO) Take  by mouth.     • albuterol 108 (90 BASE) MCG/ACT Aero Soln inhalation aerosol Inhale 2 Puffs by mouth every 6 hours as needed for Shortness of Breath.       No current facility-administered medications for this visit.     Past Medical/ Surgical History  She  has a past medical history of Asthma.  She  has a past surgical history that includes dental extraction(s).    Review of Systems   Constitutional: Negative for chills, fever, malaise/fatigue and weight  "loss.   Respiratory: Negative for cough and shortness of breath.    Cardiovascular: Negative for chest pain and palpitations.   Gastrointestinal: Negative for abdominal pain.   Genitourinary: Negative.    Musculoskeletal: Positive for joint pain (Right wrist, forearm, elbow pain). Negative for myalgias.   Skin: Negative for rash.   Neurological: Negative for dizziness and weakness.   Psychiatric/Behavioral: Negative for depression.         Objective:     /76 (BP Location: Left arm, Patient Position: Sitting, BP Cuff Size: Adult)   Pulse 78   Temp 36.3 °C (97.3 °F) (Temporal)   Ht 1.6 m (5' 3\")   Wt 87.6 kg (193 lb 3.2 oz)   SpO2 98%  Body mass index is 34.22 kg/m².    Physical Exam  Constitutional:       General: She is not in acute distress.  HENT:      Head: Normocephalic and atraumatic.   Eyes:      Conjunctiva/sclera: Conjunctivae normal.      Pupils: Pupils are equal, round, and reactive to light.   Pulmonary:      Effort: Pulmonary effort is normal. No respiratory distress.   Abdominal:      General: There is no distension.   Musculoskeletal:      Right elbow: No tenderness.      Right forearm: Bony tenderness (Radius between elbow and thumb, pinpoint.  No swelling or bruising noted) present. No swelling.      Right wrist: No swelling, tenderness, bony tenderness or snuff box tenderness.      Cervical back: Normal range of motion and neck supple.   Skin:     General: Skin is warm and dry.      Findings: No rash.   Neurological:      Mental Status: She is alert and oriented to person, place, and time.      Gait: Gait is intact.   Psychiatric:         Mood and Affect: Affect normal.        Imaging:  No recent imaging to review    Labs  No recent labs to review  Assessment and Plan:   The following treatment plan was discussed    Problem List Items Addressed This Visit     Right forearm pain     Acute.  Palpated ulnar, elbow joint, and wrist joints without any discomfort.  Pinpoint tenderness to radius " midway between elbow and thumb.  No significant swelling or bruising noted.  Imaging ordered.  Advised sling and splint to aid in recovery, can be located at any local pharmacy.  Ibuprofen 600 mg given in clinic for pain.           Relevant Orders    DX-FOREARM RIGHT           Followup: Return if symptoms worsen or fail to improve.    I have placed medication orders.  The MA is preforming medication orders under the direction of Dr. Walden    Please note that this dictation was created using voice recognition software. I have made every reasonable attempt to correct obvious errors, but I expect that there are errors of grammar and possibly content that I did not discover before finalizing the note.

## 2022-07-06 DIAGNOSIS — F33.1 MODERATE EPISODE OF RECURRENT MAJOR DEPRESSIVE DISORDER (HCC): ICD-10-CM

## 2022-07-07 RX ORDER — CITALOPRAM 20 MG/1
20 TABLET ORAL DAILY
Qty: 90 TABLET | Refills: 3 | Status: SHIPPED | OUTPATIENT
Start: 2022-07-07 | End: 2023-09-05

## 2022-08-12 ENCOUNTER — OFFICE VISIT (OUTPATIENT)
Dept: MEDICAL GROUP | Facility: MEDICAL CENTER | Age: 42
End: 2022-08-12
Payer: COMMERCIAL

## 2022-08-12 VITALS
DIASTOLIC BLOOD PRESSURE: 86 MMHG | SYSTOLIC BLOOD PRESSURE: 126 MMHG | WEIGHT: 192.4 LBS | HEIGHT: 62 IN | BODY MASS INDEX: 35.41 KG/M2 | OXYGEN SATURATION: 96 % | HEART RATE: 83 BPM | TEMPERATURE: 97.1 F

## 2022-08-12 DIAGNOSIS — M79.645 FINGER PAIN, LEFT: ICD-10-CM

## 2022-08-12 DIAGNOSIS — B37.2 YEAST INFECTION OF THE SKIN: ICD-10-CM

## 2022-08-12 DIAGNOSIS — J45.40 MODERATE PERSISTENT ASTHMA WITHOUT COMPLICATION: ICD-10-CM

## 2022-08-12 PROCEDURE — 99213 OFFICE O/P EST LOW 20 MIN: CPT | Performed by: FAMILY MEDICINE

## 2022-08-12 RX ORDER — KETOCONAZOLE 20 MG/G
1 CREAM TOPICAL DAILY
Qty: 30 G | Refills: 0 | Status: SHIPPED | OUTPATIENT
Start: 2022-08-12

## 2022-08-12 RX ORDER — ALBUTEROL SULFATE 90 UG/1
2 AEROSOL, METERED RESPIRATORY (INHALATION) EVERY 6 HOURS PRN
Qty: 8.5 G | Refills: 11 | Status: SHIPPED | OUTPATIENT
Start: 2022-08-12 | End: 2024-03-07

## 2022-08-12 RX ORDER — KETOCONAZOLE 20 MG/G
1 CREAM TOPICAL DAILY
Qty: 30 G | Refills: 0 | Status: SHIPPED | OUTPATIENT
Start: 2022-08-12 | End: 2022-08-12

## 2022-08-12 ASSESSMENT — ENCOUNTER SYMPTOMS
ABDOMINAL PAIN: 0
SHORTNESS OF BREATH: 1
PALPITATIONS: 0
FEVER: 0
WEIGHT LOSS: 0
DIZZINESS: 0
WEAKNESS: 0
COUGH: 0
CHILLS: 0
DEPRESSION: 0
MYALGIAS: 0

## 2022-08-12 ASSESSMENT — FIBROSIS 4 INDEX: FIB4 SCORE: 0.71

## 2022-08-12 NOTE — PROGRESS NOTES
Sarah Orozco is a pleasant 42 y.o. female here for   Chief Complaint   Patient presents with    Pain     Pain in fingers, mostly left fingers.        HPI:   Problem   Finger Pain, Left    PIP joint to middle finger, now involving pip joint to fourth and fifith finger.  Pain present for the last 3 to 4 months, but more so over the last few week.  Stiffness in the am.  Pain throughout the day, but worse in the am.  Denies numbness or tingling.  Concern for arthritis.  Does notice that the pain started when she started to develop swelling in her hands during summer.     Yeast Infection of The Skin    Prone to breast yeast infections, follows dermatology for this.  Normally prescribed ketoconazole cream and hydrocortisone in which she forms a paste that applies to under her breast.  She was wondering if she can have refill of these medications as she is unable to get into see her dermatologist for the next 2 months.  Also notes a rash that comes and goes around her neck, can be pruritic at times.     Moderate Persistent Asthma Without Complication    Requesting a refill of her albuterol inhaler.            Current Medicines (including changes today)  Current Outpatient Medications   Medication Sig Dispense Refill    albuterol 108 (90 Base) MCG/ACT Aero Soln inhalation aerosol Inhale 2 Puffs every 6 hours as needed for Shortness of Breath. 8.5 g 11    ketoconazole (NIZORAL) 2 % Cream Apply 1 Application topically every day. 30 g 0    hydrocortisone 2.5 % Cream topical cream Apply 1 Application topically 2 times a day. 20 g 0    citalopram (CELEXA) 20 MG Tab TAKE 1 TABLET BY MOUTH EVERY DAY 90 Tablet 3    ondansetron (ZOFRAN ODT) 4 MG TABLET DISPERSIBLE Take 1 Tablet by mouth every 6 hours as needed for Nausea. 10 Tablet 0    phenazopyridine (PYRIDIUM) 200 MG Tab Take 1 Tablet by mouth 3 times a day as needed for Moderate Pain. 6 Tablet 0    montelukast (SINGULAIR) 10 MG Tab TAKE 1 TABLET BY MOUTH EVERY DAY 90 Tablet  "3    naproxen (NAPROSYN) 500 MG Tab       fluticasone (FLONASE) 50 MCG/ACT nasal spray       FLOVENT  MCG/ACT Aerosol       LO LOESTRIN FE 1 MG-10 MCG / 10 MCG Tab       Fexofenadine HCl (ALLEGRA PO) Take  by mouth.      Ibuprofen (ADVIL PO) Take  by mouth.       No current facility-administered medications for this visit.     Past Medical/ Surgical History  She  has a past medical history of Asthma.  She  has a past surgical history that includes dental extraction(s).    Review of Systems   Constitutional:  Negative for chills, fever, malaise/fatigue and weight loss.   Respiratory:  Positive for shortness of breath (Stable but requesting a refill of her albuterol medication). Negative for cough.    Cardiovascular:  Negative for chest pain and palpitations.   Gastrointestinal:  Negative for abdominal pain.   Genitourinary: Negative.    Musculoskeletal:  Positive for joint pain (Left hand third fourth and fifth PIP joint). Negative for myalgias.   Skin:  Positive for rash (Neck and under breast).   Neurological:  Negative for dizziness and weakness.   Psychiatric/Behavioral:  Negative for depression.        Objective:     /86 (BP Location: Left arm, Patient Position: Sitting, BP Cuff Size: Adult)   Pulse 83   Temp 36.2 °C (97.1 °F) (Temporal)   Ht 1.575 m (5' 2\")   Wt 87.3 kg (192 lb 6.4 oz)   SpO2 96%  Body mass index is 35.19 kg/m².    Physical Exam  Constitutional:       General: She is not in acute distress.  HENT:      Head: Normocephalic and atraumatic.   Eyes:      Conjunctiva/sclera: Conjunctivae normal.      Pupils: Pupils are equal, round, and reactive to light.   Pulmonary:      Effort: Pulmonary effort is normal. No respiratory distress.   Abdominal:      General: There is no distension.   Musculoskeletal:      Left hand: Swelling and tenderness (Third fourth and fifth PIP joint with mild swelling) present.      Cervical back: Normal range of motion and neck supple.   Skin:     General: " Skin is warm and dry.      Findings: No rash.      Comments: Scattered macular papular rash noted to base of neck and along collar line   Neurological:      Mental Status: She is alert and oriented to person, place, and time.      Gait: Gait is intact.   Psychiatric:         Mood and Affect: Affect normal.      Imaging:  No recent imaging to review    Labs  No recent labs to review  Assessment and Plan:   The following treatment plan was discussed    Problem List Items Addressed This Visit       Moderate persistent asthma without complication     Chronic, stable.  Albuterol inhaler sent to her preferred pharmacy.         Relevant Medications    albuterol 108 (90 Base) MCG/ACT Aero Soln inhalation aerosol    Finger pain, left     Subacute.  Recommended turmeric p.o. daily supplementation.  Offered x-ray, ordered.  Patient like to trial turmeric before getting any additional imaging done.  Recommended ibuprofen 600 mg to help with any inflammation and discomfort.         Relevant Orders    DX-FINGER(S) 2+ LEFT    Yeast infection of the skin     Chronic, unstable.  Refill of the ketoconazole and hydrocortisone cream ordered for the patient.  Advised to use hydrocortisone cream lightly to her neck but to avoid her face.  Continue to follow-up with specialty.         Relevant Medications    ketoconazole (NIZORAL) 2 % Cream    hydrocortisone 2.5 % Cream topical cream        Followup: Return if symptoms worsen or fail to improve.        Please note that this dictation was created using voice recognition software. I have made every reasonable attempt to correct obvious errors, but I expect that there are errors of grammar and possibly content that I did not discover before finalizing the note.

## 2022-08-12 NOTE — ASSESSMENT & PLAN NOTE
Subacute.  Recommended turmeric p.o. daily supplementation.  Offered x-ray, ordered.  Patient like to trial turmeric before getting any additional imaging done.  Recommended ibuprofen 600 mg to help with any inflammation and discomfort.

## 2022-08-12 NOTE — ASSESSMENT & PLAN NOTE
Chronic, unstable.  Refill of the ketoconazole and hydrocortisone cream ordered for the patient.  Advised to use hydrocortisone cream lightly to her neck but to avoid her face.  Continue to follow-up with specialty.

## 2022-09-20 ENCOUNTER — APPOINTMENT (OUTPATIENT)
Dept: MEDICAL GROUP | Facility: MEDICAL CENTER | Age: 42
End: 2022-09-20
Payer: COMMERCIAL

## 2022-12-13 ENCOUNTER — OFFICE VISIT (OUTPATIENT)
Dept: MEDICAL GROUP | Facility: MEDICAL CENTER | Age: 42
End: 2022-12-13
Payer: COMMERCIAL

## 2022-12-13 VITALS
HEART RATE: 76 BPM | OXYGEN SATURATION: 96 % | BODY MASS INDEX: 35.99 KG/M2 | SYSTOLIC BLOOD PRESSURE: 124 MMHG | HEIGHT: 62 IN | DIASTOLIC BLOOD PRESSURE: 78 MMHG | WEIGHT: 195.6 LBS | TEMPERATURE: 97.5 F

## 2022-12-13 DIAGNOSIS — J45.40 MODERATE PERSISTENT ASTHMA WITHOUT COMPLICATION: ICD-10-CM

## 2022-12-13 DIAGNOSIS — R05.1 ACUTE COUGH: ICD-10-CM

## 2022-12-13 DIAGNOSIS — U07.1 COVID-19: ICD-10-CM

## 2022-12-13 LAB
EXTERNAL QUALITY CONTROL: ABNORMAL
INT CON NEG: NEGATIVE
INT CON POS: POSITIVE
SARS-COV+SARS-COV-2 AG RESP QL IA.RAPID: POSITIVE

## 2022-12-13 PROCEDURE — 87426 SARSCOV CORONAVIRUS AG IA: CPT | Performed by: FAMILY MEDICINE

## 2022-12-13 PROCEDURE — 99214 OFFICE O/P EST MOD 30 MIN: CPT | Mod: CS | Performed by: FAMILY MEDICINE

## 2022-12-13 RX ORDER — CLOBETASOL PROPIONATE 0.5 MG/G
CREAM TOPICAL
COMMUNITY
Start: 2022-10-04

## 2022-12-13 RX ORDER — PREDNISONE 20 MG/1
TABLET ORAL
Qty: 8 TABLET | Refills: 0 | Status: SHIPPED | OUTPATIENT
Start: 2022-12-13 | End: 2022-12-18

## 2022-12-13 ASSESSMENT — FIBROSIS 4 INDEX: FIB4 SCORE: 0.71

## 2022-12-13 NOTE — LETTER
December 13, 2022    To Whom It May Concern:         This is confirmation that Sarah Orozco attended her scheduled appointment with ALYSON Brar on 12/13/22.  Please excuse her from work for the remainder of the week due to illness.  She is okay to return December 19.         If you have any questions please do not hesitate to call me at the phone number listed below.    Sincerely,          ASHUTOSH BrarRALON.  [phone]

## 2022-12-13 NOTE — ASSESSMENT & PLAN NOTE
Chronic, Unstable.  Recommended that the patient continue to use her albuterol 108 mcg/ACT aerosol solution inhalation 2 puffs every 6 hours as needed for any shortness of breath difficulty breathing.  She is concerned that she may be transmitting an illness to others we did discuss testing for COVID.  POCT COVID did come back positive.  Due to patient's history of asthma and her reports of difficulty with breathing we will go ahead and prescribe her Paxlovid 300/100 20 x 150 mg and 10 x 100 mg therapy pack.  Confirmed that this medication does not interact with her daily medications.  Discussed potential side effects such as upset stomach.  Prednisone 40 mg x 3 days, 20 mg for 2 days sent to her preferred pharmacy if she has no improvement in her cough in the next week.

## 2022-12-13 NOTE — ASSESSMENT & PLAN NOTE
Acute.  Concerned that the cough may be a sign of an asthma flare.  We did complete POCT COVID testing which came back positive.  Patient started on Paxlovid help shorten duration of illness secondary to her increase in risk associated with asthma.  Recommended that she continue to use albuterol inhaler.

## 2022-12-13 NOTE — PROGRESS NOTES
Sarah Orozco is a pleasant 42 y.o. female here for   Chief Complaint   Patient presents with    Cough    Breathing Problem      HPI:   Problem   Acute Cough    New on start of an acute cough sunday.  Denies any significant other symptoms such as fevers, nausea, sore throat, worsening congestion and headache.  Concerned that she may have caught a virus, worried about transmitting it to others.  Positive sick contacts at home.  Was told it was just a viral illness from child's pediatrician.      Moderate Persistent Asthma Without Complication    + sick contacts with family.  Her daughter has been in and out of her pediatrician's office told that it was only a viral infection.  She has been experiencing headaches and nasal congestion that this is chronic for her.  Denies any change in her headaches or nasal congestion.  Sunday developed a cough.  Yesterday started to have difficulty with breathing.  + Albuterol inhaler usage x 3 yesterday.  Has not used today.  Denies any body aches, throat pain.          Current Medicines (including changes today)  Current Outpatient Medications   Medication Sig Dispense Refill    predniSONE (DELTASONE) 20 MG Tab Take 2 Tablets by mouth every day for 3 days, THEN 1 Tablet every day for 2 days. 8 Tablet 0    Nirmatrelvir&Ritonavir 300/100 20 x 150 MG & 10 x 100MG Tablet Therapy Pack Take 300 mg nirmatrelvir (two 150 mg tablets) with 100 mg ritonavir (one 100 mg tablet) by mouth, with all three tablets taken together twice daily for 5 days. 30 Each 0    clobetasol (TEMOVATE) 0.05 % Cream APPLY TO EFFECTED AREA EXTERNALLY TWICE A DAY 10 DAY(S)      albuterol 108 (90 Base) MCG/ACT Aero Soln inhalation aerosol Inhale 2 Puffs every 6 hours as needed for Shortness of Breath. 8.5 g 11    ketoconazole (NIZORAL) 2 % Cream Apply 1 Application topically every day. 30 g 0    hydrocortisone 2.5 % Cream topical cream Apply 1 Application topically 2 times a day. 20 g 0    citalopram (CELEXA) 20  "MG Tab TAKE 1 TABLET BY MOUTH EVERY DAY 90 Tablet 3    ondansetron (ZOFRAN ODT) 4 MG TABLET DISPERSIBLE Take 1 Tablet by mouth every 6 hours as needed for Nausea. 10 Tablet 0    phenazopyridine (PYRIDIUM) 200 MG Tab Take 1 Tablet by mouth 3 times a day as needed for Moderate Pain. 6 Tablet 0    montelukast (SINGULAIR) 10 MG Tab TAKE 1 TABLET BY MOUTH EVERY DAY 90 Tablet 3    naproxen (NAPROSYN) 500 MG Tab       fluticasone (FLONASE) 50 MCG/ACT nasal spray       FLOVENT  MCG/ACT Aerosol       LO LOESTRIN FE 1 MG-10 MCG / 10 MCG Tab       Fexofenadine HCl (ALLEGRA PO) Take  by mouth.      Ibuprofen (ADVIL PO) Take  by mouth.       No current facility-administered medications for this visit.     Past Medical/ Surgical History  She  has a past medical history of Asthma.  She  has a past surgical history that includes dental extraction(s).     Objective:     /78 (BP Location: Left arm, Patient Position: Sitting, BP Cuff Size: Adult)   Pulse 76   Temp 36.4 °C (97.5 °F) (Temporal)   Ht 1.575 m (5' 2\")   Wt 88.7 kg (195 lb 9.6 oz)   SpO2 96%  Body mass index is 35.78 kg/m².    Physical Exam  Constitutional:       General: She is not in acute distress.     Appearance: She is not ill-appearing or toxic-appearing.   HENT:      Head: Normocephalic.      Right Ear: Tympanic membrane and external ear normal.      Left Ear: Tympanic membrane and external ear normal.      Nose: Nose normal. No rhinorrhea.      Mouth/Throat:      Mouth: Mucous membranes are moist.      Pharynx: Oropharynx is clear. No posterior oropharyngeal erythema.   Eyes:      General:         Right eye: No discharge.         Left eye: No discharge.      Conjunctiva/sclera: Conjunctivae normal.      Pupils: Pupils are equal, round, and reactive to light.   Cardiovascular:      Rate and Rhythm: Normal rate and regular rhythm.      Heart sounds: No murmur heard.  Pulmonary:      Effort: Pulmonary effort is normal. No respiratory distress.      " Breath sounds: Normal breath sounds. No wheezing.   Abdominal:      General: Abdomen is flat.   Musculoskeletal:         General: No swelling or tenderness.      Cervical back: Normal range of motion and neck supple.      Right lower leg: No edema.      Left lower leg: No edema.   Skin:     General: Skin is warm.   Neurological:      General: No focal deficit present.      Mental Status: She is alert and oriented to person, place, and time. Mental status is at baseline.   Psychiatric:         Mood and Affect: Mood normal.        Imaging:  No imaging    Labs  12/13/2022 POC COVID: Positive  Assessment and Plan:   The following treatment plan was discussed     Problem List Items Addressed This Visit       Moderate persistent asthma without complication     Chronic, Unstable.  Recommended that the patient continue to use her albuterol 108 mcg/ACT aerosol solution inhalation 2 puffs every 6 hours as needed for any shortness of breath difficulty breathing.  She is concerned that she may be transmitting an illness to others we did discuss testing for COVID.  POCT COVID did come back positive.  Due to patient's history of asthma and her reports of difficulty with breathing we will go ahead and prescribe her Paxlovid 300/100 20 x 150 mg and 10 x 100 mg therapy pack.  Confirmed that this medication does not interact with her daily medications.  Discussed potential side effects such as upset stomach.  Prednisone 40 mg x 3 days, 20 mg for 2 days sent to her preferred pharmacy if she has no improvement in her cough in the next week.         Relevant Medications    predniSONE (DELTASONE) 20 MG Tab    Acute cough     Acute.  Concerned that the cough may be a sign of an asthma flare.  We did complete POCT COVID testing which came back positive.  Patient started on Paxlovid help shorten duration of illness secondary to her increase in risk associated with asthma.  Recommended that she continue to use albuterol inhaler.          Relevant Medications    predniSONE (DELTASONE) 20 MG Tab     Other Visit Diagnoses       COVID-19        Relevant Medications    Nirmatrelvir&Ritonavir 300/100 20 x 150 MG & 10 x 100MG Tablet Therapy Pack             Followup: Return if symptoms worsen or fail to improve.    I have placed Covid swab orders.  The MA is preforming Covid swab orders under the direction of Dr. Walden    Please note that this dictation was created using voice recognition software. I have made every reasonable attempt to correct obvious errors, but I expect that there are errors of grammar and possibly content that I did not discover before finalizing the note.

## 2023-03-02 ENCOUNTER — OFFICE VISIT (OUTPATIENT)
Dept: MEDICAL GROUP | Facility: MEDICAL CENTER | Age: 43
End: 2023-03-02
Payer: COMMERCIAL

## 2023-03-02 VITALS
SYSTOLIC BLOOD PRESSURE: 108 MMHG | OXYGEN SATURATION: 96 % | HEART RATE: 79 BPM | HEIGHT: 63 IN | TEMPERATURE: 97.4 F | BODY MASS INDEX: 33.84 KG/M2 | DIASTOLIC BLOOD PRESSURE: 70 MMHG | WEIGHT: 191 LBS | RESPIRATION RATE: 16 BRPM

## 2023-03-02 DIAGNOSIS — J01.40 ACUTE NON-RECURRENT PANSINUSITIS: ICD-10-CM

## 2023-03-02 DIAGNOSIS — R09.81 NASAL CONGESTION: ICD-10-CM

## 2023-03-02 PROCEDURE — 99214 OFFICE O/P EST MOD 30 MIN: CPT | Performed by: FAMILY MEDICINE

## 2023-03-02 RX ORDER — DOXYCYCLINE HYCLATE 100 MG
100 TABLET ORAL 2 TIMES DAILY
Qty: 14 TABLET | Refills: 0 | Status: SHIPPED | OUTPATIENT
Start: 2023-03-02 | End: 2023-03-09

## 2023-03-02 RX ORDER — CRISABOROLE 20 MG/G
OINTMENT TOPICAL
COMMUNITY
Start: 2023-01-10

## 2023-03-02 ASSESSMENT — FIBROSIS 4 INDEX: FIB4 SCORE: 0.71

## 2023-03-02 NOTE — ASSESSMENT & PLAN NOTE
Acute on chronic condition.  Physical exam reveals tenderness over the maxillary and frontal sinuses with swollen turbinates.  Do suspect potential bacterial infection due to the longevity of the congestion.  We will treat patient with doxycycline 100 mg twice daily x7 days.  For the jaw discomfort, recommended that she reach out to her dentist for possibly a nightguard or TMJ to follow-up with the discomfort.  Recommended that she continue the naproxen 500 mg as needed for discomfort.

## 2023-03-02 NOTE — PROGRESS NOTES
Sarah Orozco is a pleasant 42 y.o. female here for   Chief Complaint   Patient presents with    Jaw Pain    Otalgia     X4 days    Sinus Problem      HPI:   Problem   Nasal Congestion    Chronic congestion, but worse over the last 1.5 week.  + fatigue, sinus headaches, cough.  - fevers, ear drainage  Over the last 4 days started to get jaw pain, history of TMJ but this feels different  Pain is by her ear, unsure if this is a dental issue.  Describes as an ache, increase in pain with chewing and yawning.  No pain with palpation.    Has followed up with ENT in the past for her nasal congestion and jaw popping, has been prescribed naproxen in the past for TMJ.    She has only been taking this medication at bedtime and not during the day.          Current Medicines (including changes today)  Current Outpatient Medications   Medication Sig Dispense Refill    EUCRISA 2 % Ointment       doxycycline (VIBRAMYCIN) 100 MG Tab Take 1 Tablet by mouth 2 times a day for 7 days. 14 Tablet 0    clobetasol (TEMOVATE) 0.05 % Cream APPLY TO EFFECTED AREA EXTERNALLY TWICE A DAY 10 DAY(S)      albuterol 108 (90 Base) MCG/ACT Aero Soln inhalation aerosol Inhale 2 Puffs every 6 hours as needed for Shortness of Breath. 8.5 g 11    ketoconazole (NIZORAL) 2 % Cream Apply 1 Application topically every day. 30 g 0    hydrocortisone 2.5 % Cream topical cream Apply 1 Application topically 2 times a day. 20 g 0    citalopram (CELEXA) 20 MG Tab TAKE 1 TABLET BY MOUTH EVERY DAY 90 Tablet 3    montelukast (SINGULAIR) 10 MG Tab TAKE 1 TABLET BY MOUTH EVERY DAY 90 Tablet 3    naproxen (NAPROSYN) 500 MG Tab       fluticasone (FLONASE) 50 MCG/ACT nasal spray       Ibuprofen (ADVIL PO) Take  by mouth.       No current facility-administered medications for this visit.     Past Medical/ Surgical History  She  has a past medical history of Asthma.  She  has a past surgical history that includes dental extraction(s).     Objective:     /70 (BP  "Location: Left arm, Patient Position: Sitting, BP Cuff Size: Adult)   Pulse 79   Temp 36.3 °C (97.4 °F) (Temporal)   Resp 16   Ht 1.6 m (5' 3\")   Wt 86.6 kg (191 lb)   SpO2 96%  Body mass index is 33.83 kg/m².    Physical Exam  Constitutional:       General: She is not in acute distress.     Appearance: She is not ill-appearing or toxic-appearing.   HENT:      Head: Normocephalic.      Right Ear: Tympanic membrane and external ear normal.      Left Ear: Tympanic membrane and external ear normal.      Nose: Congestion present.      Right Turbinates: Swollen.      Left Turbinates: Swollen.      Right Sinus: Maxillary sinus tenderness and frontal sinus tenderness present.      Left Sinus: Maxillary sinus tenderness and frontal sinus tenderness present.      Mouth/Throat:      Mouth: Mucous membranes are moist.      Pharynx: Oropharynx is clear. Posterior oropharyngeal erythema (mild) present.   Eyes:      General:         Right eye: No discharge.         Left eye: No discharge.      Conjunctiva/sclera: Conjunctivae normal.      Pupils: Pupils are equal, round, and reactive to light.   Cardiovascular:      Rate and Rhythm: Normal rate and regular rhythm.      Heart sounds: No murmur heard.  Pulmonary:      Effort: Pulmonary effort is normal. No respiratory distress.      Breath sounds: Normal breath sounds. No wheezing.   Abdominal:      General: Abdomen is flat.   Musculoskeletal:         General: No swelling or tenderness.      Cervical back: Normal range of motion and neck supple.      Right lower leg: No edema.      Left lower leg: No edema.   Lymphadenopathy:      Cervical: No cervical adenopathy.   Skin:     General: Skin is warm.   Neurological:      General: No focal deficit present.      Mental Status: She is alert and oriented to person, place, and time. Mental status is at baseline.   Psychiatric:         Mood and Affect: Mood normal.        Imaging:  No imaging    Labs  No labs  Assessment and Plan: "   The following treatment plan was discussed     Problem List Items Addressed This Visit       Nasal congestion     Acute on chronic condition.  Physical exam reveals tenderness over the maxillary and frontal sinuses with swollen turbinates.  Do suspect potential bacterial infection due to the longevity of the congestion.  We will treat patient with doxycycline 100 mg twice daily x7 days.  For the jaw discomfort, recommended that she reach out to her dentist for possibly a nightguard or TMJ to follow-up with the discomfort.  Recommended that she continue the naproxen 500 mg as needed for discomfort.          Other Visit Diagnoses       Acute non-recurrent pansinusitis        Relevant Medications    doxycycline (VIBRAMYCIN) 100 MG Tab             Followup: Return if symptoms worsen or fail to improve.      Please note that this dictation was created using voice recognition software. I have made every reasonable attempt to correct obvious errors, but I expect that there are errors of grammar and possibly content that I did not discover before finalizing the note.

## 2023-03-29 NOTE — PROCEDURES
I and D  Date/Time: 5/26/2019 6:23 PM  Performed by: KELLI BARONE  Authorized by: KELLI BARONE   Indications for incision and drainage: Thrombosed hemorrhoid.  Body area: anogenital  Location details: perianal    Anesthesia:  Local Anesthetic: topical anesthetic (Topical lidociane gel)  Scalpel size: 11  Incision type: single straight  Complexity: simple  Drainage: bloody  Drainage amount: copious  Wound treatment: wound left open  Patient tolerance: Patient tolerated the procedure well with no immediate complications              
Render Risk Assessment In Note?: no
Additional Notes: Plan for dermaplanning in 2 weeks ($100).
Detail Level: Simple

## 2023-05-04 ENCOUNTER — OFFICE VISIT (OUTPATIENT)
Dept: MEDICAL GROUP | Facility: MEDICAL CENTER | Age: 43
End: 2023-05-04
Payer: COMMERCIAL

## 2023-05-04 VITALS
SYSTOLIC BLOOD PRESSURE: 108 MMHG | TEMPERATURE: 97 F | BODY MASS INDEX: 34.2 KG/M2 | HEART RATE: 71 BPM | HEIGHT: 63 IN | DIASTOLIC BLOOD PRESSURE: 80 MMHG | WEIGHT: 193 LBS | OXYGEN SATURATION: 97 %

## 2023-05-04 DIAGNOSIS — R22.0 HEAD LUMP: ICD-10-CM

## 2023-05-04 PROCEDURE — 99213 OFFICE O/P EST LOW 20 MIN: CPT | Performed by: FAMILY MEDICINE

## 2023-05-04 ASSESSMENT — PATIENT HEALTH QUESTIONNAIRE - PHQ9
8. MOVING OR SPEAKING SO SLOWLY THAT OTHER PEOPLE COULD HAVE NOTICED. OR THE OPPOSITE, BEING SO FIGETY OR RESTLESS THAT YOU HAVE BEEN MOVING AROUND A LOT MORE THAN USUAL: NOT AT ALL
5. POOR APPETITE OR OVEREATING: SEVERAL DAYS
7. TROUBLE CONCENTRATING ON THINGS, SUCH AS READING THE NEWSPAPER OR WATCHING TELEVISION: NOT AT ALL
9. THOUGHTS THAT YOU WOULD BE BETTER OFF DEAD, OR OF HURTING YOURSELF: NOT AT ALL
CLINICAL INTERPRETATION OF PHQ2 SCORE: 0
2. FEELING DOWN, DEPRESSED, IRRITABLE, OR HOPELESS: NOT AT ALL
SUM OF ALL RESPONSES TO PHQ9 QUESTIONS 1 AND 2: 0
3. TROUBLE FALLING OR STAYING ASLEEP OR SLEEPING TOO MUCH: SEVERAL DAYS
SUM OF ALL RESPONSES TO PHQ QUESTIONS 1-9: 3
6. FEELING BAD ABOUT YOURSELF - OR THAT YOU ARE A FAILURE OR HAVE LET YOURSELF OR YOUR FAMILY DOWN: NOT AL ALL
1. LITTLE INTEREST OR PLEASURE IN DOING THINGS: NOT AT ALL
4. FEELING TIRED OR HAVING LITTLE ENERGY: SEVERAL DAYS

## 2023-05-04 ASSESSMENT — FIBROSIS 4 INDEX: FIB4 SCORE: 0.71

## 2023-05-04 NOTE — ASSESSMENT & PLAN NOTE
Subacute.  Physical exam reveals mobile subdermal lump to left scalp line approximately 7 mm in diameter.  No erythema, induration.  Suspect possible cyst, recommended following up with dermatology for removal.  Advised to keep an eye for increasing growth.

## 2023-05-04 NOTE — PROGRESS NOTES
"Sarah Orozco is a pleasant 42 y.o. female here for   Chief Complaint   Patient presents with    Bump     On forehead, x4 months, itchy      HPI:   Problem   Head Lump    Left forehead noticed in february.  Mobile, not getting bigger and doesn't hurt.  Does not know why it started, states her  is 2 that noticed it more.  It does not bother her.  She does have a dermatologist that she sees.          Current Medicines (including changes today)  Current Outpatient Medications   Medication Sig Dispense Refill    EUCRISA 2 % Ointment       clobetasol (TEMOVATE) 0.05 % Cream APPLY TO EFFECTED AREA EXTERNALLY TWICE A DAY 10 DAY(S)      albuterol 108 (90 Base) MCG/ACT Aero Soln inhalation aerosol Inhale 2 Puffs every 6 hours as needed for Shortness of Breath. 8.5 g 11    ketoconazole (NIZORAL) 2 % Cream Apply 1 Application topically every day. 30 g 0    hydrocortisone 2.5 % Cream topical cream Apply 1 Application topically 2 times a day. 20 g 0    citalopram (CELEXA) 20 MG Tab TAKE 1 TABLET BY MOUTH EVERY DAY 90 Tablet 3    montelukast (SINGULAIR) 10 MG Tab TAKE 1 TABLET BY MOUTH EVERY DAY 90 Tablet 3    naproxen (NAPROSYN) 500 MG Tab       fluticasone (FLONASE) 50 MCG/ACT nasal spray       Ibuprofen (ADVIL PO) Take  by mouth.       No current facility-administered medications for this visit.     Past Medical/ Surgical History  She  has a past medical history of Asthma.  She  has a past surgical history that includes dental extraction(s).     Objective:     /80 (BP Location: Left arm, Patient Position: Sitting, BP Cuff Size: Large adult)   Pulse 71   Temp 36.1 °C (97 °F) (Temporal)   Ht 1.588 m (5' 2.5\")   Wt 87.5 kg (193 lb)   SpO2 97%  Body mass index is 34.74 kg/m².    Physical Exam  Constitutional:       General: She is not in acute distress.  HENT:      Head: Normocephalic and atraumatic.   Eyes:      Conjunctiva/sclera: Conjunctivae normal.      Pupils: Pupils are equal, round, and reactive to " light.   Pulmonary:      Effort: Pulmonary effort is normal. No respiratory distress.   Abdominal:      General: There is no distension.   Musculoskeletal:      Cervical back: Normal range of motion and neck supple.   Skin:     General: Skin is warm and dry.      Findings: No rash.          Neurological:      Mental Status: She is alert and oriented to person, place, and time.      Gait: Gait is intact.   Psychiatric:         Mood and Affect: Affect normal.        Imaging:  No new imaging    Labs  No updated labs    Assessment and Plan:   The following treatment plan was discussed     Problem List Items Addressed This Visit       Head lump     Subacute.  Physical exam reveals mobile subdermal lump to left scalp line approximately 7 mm in diameter.  No erythema, induration.  Suspect possible cyst, recommended following up with dermatology for removal.  Advised to keep an eye for increasing growth.             Followup: Return if symptoms worsen or fail to improve.        Please note that this dictation was created using voice recognition software. I have made every reasonable attempt to correct obvious errors, but I expect that there are errors of grammar and possibly content that I did not discover before finalizing the note.

## 2023-05-29 DIAGNOSIS — J45.40 MODERATE PERSISTENT ASTHMA WITHOUT COMPLICATION: ICD-10-CM

## 2023-05-30 RX ORDER — MONTELUKAST SODIUM 10 MG/1
10 TABLET ORAL DAILY
Qty: 90 TABLET | Refills: 3 | Status: SHIPPED | OUTPATIENT
Start: 2023-05-30 | End: 2023-09-26

## 2023-09-15 ENCOUNTER — OFFICE VISIT (OUTPATIENT)
Dept: URGENT CARE | Facility: CLINIC | Age: 43
End: 2023-09-15
Payer: COMMERCIAL

## 2023-09-15 VITALS
BODY MASS INDEX: 33.84 KG/M2 | SYSTOLIC BLOOD PRESSURE: 108 MMHG | OXYGEN SATURATION: 96 % | HEIGHT: 63 IN | DIASTOLIC BLOOD PRESSURE: 76 MMHG | TEMPERATURE: 98.5 F | RESPIRATION RATE: 18 BRPM | HEART RATE: 84 BPM | WEIGHT: 191 LBS

## 2023-09-15 DIAGNOSIS — J45.901 ASTHMA WITH ACUTE EXACERBATION, UNSPECIFIED ASTHMA SEVERITY, UNSPECIFIED WHETHER PERSISTENT: ICD-10-CM

## 2023-09-15 DIAGNOSIS — J06.9 VIRAL URI WITH COUGH: ICD-10-CM

## 2023-09-15 LAB
FLUAV RNA SPEC QL NAA+PROBE: NEGATIVE
FLUBV RNA SPEC QL NAA+PROBE: NEGATIVE
RSV RNA SPEC QL NAA+PROBE: NEGATIVE
SARS-COV-2 RNA RESP QL NAA+PROBE: NEGATIVE

## 2023-09-15 PROCEDURE — 0241U POCT CEPHEID COV-2, FLU A/B, RSV - PCR: CPT | Performed by: PHYSICIAN ASSISTANT

## 2023-09-15 PROCEDURE — 94640 AIRWAY INHALATION TREATMENT: CPT | Performed by: PHYSICIAN ASSISTANT

## 2023-09-15 PROCEDURE — 3074F SYST BP LT 130 MM HG: CPT | Performed by: PHYSICIAN ASSISTANT

## 2023-09-15 PROCEDURE — 3078F DIAST BP <80 MM HG: CPT | Performed by: PHYSICIAN ASSISTANT

## 2023-09-15 PROCEDURE — 99214 OFFICE O/P EST MOD 30 MIN: CPT | Mod: 25 | Performed by: PHYSICIAN ASSISTANT

## 2023-09-15 RX ORDER — ALBUTEROL SULFATE 2.5 MG/3ML
2.5 SOLUTION RESPIRATORY (INHALATION) EVERY 4 HOURS PRN
Qty: 90 ML | Refills: 0 | Status: SHIPPED | OUTPATIENT
Start: 2023-09-15

## 2023-09-15 RX ORDER — OFLOXACIN 3 MG/ML
SOLUTION/ DROPS OPHTHALMIC
COMMUNITY
Start: 2023-07-26

## 2023-09-15 RX ORDER — PREDNISONE 20 MG/1
TABLET ORAL
Qty: 8 TABLET | Refills: 0 | Status: SHIPPED | OUTPATIENT
Start: 2023-09-15 | End: 2023-09-26

## 2023-09-15 RX ORDER — IPRATROPIUM BROMIDE AND ALBUTEROL SULFATE 2.5; .5 MG/3ML; MG/3ML
3 SOLUTION RESPIRATORY (INHALATION) ONCE
Status: COMPLETED | OUTPATIENT
Start: 2023-09-15 | End: 2023-09-15

## 2023-09-15 RX ADMIN — IPRATROPIUM BROMIDE AND ALBUTEROL SULFATE 3 ML: 2.5; .5 SOLUTION RESPIRATORY (INHALATION) at 08:56

## 2023-09-15 ASSESSMENT — ENCOUNTER SYMPTOMS
RHINORRHEA: 1
SPUTUM PRODUCTION: 1
COUGH: 1
SINUS PAIN: 1
CHILLS: 0
VOMITING: 0
HEADACHES: 1
WHEEZING: 1
SORE THROAT: 1
FEVER: 0
DIARRHEA: 0
SHORTNESS OF BREATH: 1

## 2023-09-15 NOTE — PROGRESS NOTES
Subjective     Sarah Orozco is a 43 y.o. female who presents with Cough (X4 days, congested, SOB has asthma inhaler not helping, and negative Covid-19 test at home )      Cough  This is a new problem. The current episode started in the past 7 days (started 4 days ago). The problem has been gradually worsening. The problem occurs constantly. The cough is Productive of sputum. Associated symptoms include headaches, nasal congestion, postnasal drip, rhinorrhea, a sore throat, shortness of breath and wheezing. Pertinent negatives include no chills, fever or rash. The symptoms are aggravated by lying down. She has tried OTC cough suppressant and a beta-agonist inhaler for the symptoms. The treatment provided mild relief. Her past medical history is significant for asthma.   Patient did an at home test for COVID-19 virus 24 hours after her symptom onset.  Test was negative.    Review of Systems   Constitutional:  Positive for malaise/fatigue. Negative for chills and fever.   HENT:  Positive for congestion, postnasal drip, rhinorrhea, sinus pain and sore throat.    Respiratory:  Positive for cough, sputum production, shortness of breath and wheezing.    Gastrointestinal:  Negative for diarrhea and vomiting.   Skin:  Negative for rash.   Neurological:  Positive for headaches.         PMH:  has a past medical history of Asthma.  MEDS:   Current Outpatient Medications:     citalopram (CELEXA) 20 MG Tab, TAKE 1 TABLET BY MOUTH EVERY DAY, Disp: 100 Tablet, Rfl: 3    montelukast (SINGULAIR) 10 MG Tab, TAKE 1 TABLET BY MOUTH EVERY DAY, Disp: 90 Tablet, Rfl: 3    EUCRISA 2 % Ointment, , Disp: , Rfl:     clobetasol (TEMOVATE) 0.05 % Cream, APPLY TO EFFECTED AREA EXTERNALLY TWICE A DAY 10 DAY(S), Disp: , Rfl:     albuterol 108 (90 Base) MCG/ACT Aero Soln inhalation aerosol, Inhale 2 Puffs every 6 hours as needed for Shortness of Breath., Disp: 8.5 g, Rfl: 11    ketoconazole (NIZORAL) 2 % Cream, Apply 1 Application topically  "every day., Disp: 30 g, Rfl: 0    hydrocortisone 2.5 % Cream topical cream, Apply 1 Application topically 2 times a day., Disp: 20 g, Rfl: 0    naproxen (NAPROSYN) 500 MG Tab, , Disp: , Rfl:     fluticasone (FLONASE) 50 MCG/ACT nasal spray, , Disp: , Rfl:     Ibuprofen (ADVIL PO), Take  by mouth., Disp: , Rfl:     ofloxacin (OCUFLOX) 0.3 % Solution, PLACE 2 DROPS 4 TIMES A DAY UNTIL DIRECTED TO STOP. X 7 DAYS (Patient not taking: Reported on 9/15/2023), Disp: , Rfl:   ALLERGIES: No Known Allergies  SURGHX:   Past Surgical History:   Procedure Laterality Date    DENTAL EXTRACTION(S)      wisdom teeth     SOCHX:  reports that she has never smoked. She has never used smokeless tobacco. She reports current alcohol use. She reports that she does not use drugs.  FH: Family history was reviewed, no pertinent findings to report      Objective     /76   Pulse 84   Temp 36.9 °C (98.5 °F) (Temporal)   Resp 18   Ht 1.6 m (5' 3\")   Wt 86.6 kg (191 lb)   SpO2 96%   BMI 33.83 kg/m²      Physical Exam  Constitutional:       Appearance: She is well-developed.   HENT:      Head: Normocephalic and atraumatic.      Right Ear: Tympanic membrane, ear canal and external ear normal.      Left Ear: Tympanic membrane, ear canal and external ear normal.      Nose: Mucosal edema, congestion and rhinorrhea present. Rhinorrhea is clear.      Mouth/Throat:      Lips: Pink.      Mouth: Mucous membranes are moist.      Pharynx: Uvula midline. Posterior oropharyngeal erythema present. No oropharyngeal exudate or uvula swelling.   Eyes:      Conjunctiva/sclera: Conjunctivae normal.      Pupils: Pupils are equal, round, and reactive to light.   Cardiovascular:      Rate and Rhythm: Normal rate and regular rhythm.      Heart sounds: Normal heart sounds. No murmur heard.  Pulmonary:      Effort: Pulmonary effort is normal.      Breath sounds: Wheezing present.   Musculoskeletal:      Cervical back: Normal range of motion.   Lymphadenopathy: "      Cervical: No cervical adenopathy.   Skin:     General: Skin is warm and dry.      Capillary Refill: Capillary refill takes less than 2 seconds.   Neurological:      Mental Status: She is alert and oriented to person, place, and time.   Psychiatric:         Behavior: Behavior normal.         Judgment: Judgment normal.       POCT CoV-2, Flu A/B, RSV by PCR - Negative      *Status post nebulizer treatment wheezing was no longer auscultated  Assessment & Plan     1. Viral URI with cough  - POCT CoV-2, Flu A/B, RSV by PCR  - ipratropium-albuterol (DUONEB) nebulizer solution  - albuterol (PROVENTIL) 2.5mg/3ml Nebu Soln solution for nebulization; Take 3 mL by nebulization every four hours as needed for Shortness of Breath.  Dispense: 90 mL; Refill: 0  - predniSONE (DELTASONE) 20 MG Tab; Take 2 tabs once daily for 4 days  Dispense: 8 Tablet; Refill: 0  - OTC cold/flu medications  -Supportive care also discussed to include the use of saline nasal rinses, steam inhalation, and the use of a cool-mist humidifier in the bedroom at night.  - PO fluids  - Rest  - Tylenol or ibuprofen as needed for fever > 100.4 F    2. Asthma with acute exacerbation, unspecified asthma severity, unspecified whether persistent  - ipratropium-albuterol (DUONEB) nebulizer solution  - albuterol (PROVENTIL) 2.5mg/3ml Nebu Soln solution for nebulization; Take 3 mL by nebulization every four hours as needed for Shortness of Breath.  Dispense: 90 mL; Refill: 0  - predniSONE (DELTASONE) 20 MG Tab; Take 2 tabs once daily for 4 days  Dispense: 8 Tablet; Refill: 0          Differential Diagnosis, natural history, and supportive care discussed. Return to the Urgent Care or follow up with your PCP if symptoms fail to resolve, or for any new or worsening symptoms. Emergency room precautions discussed. Patient and/or family appears understanding of information.

## 2023-09-26 ENCOUNTER — OFFICE VISIT (OUTPATIENT)
Dept: MEDICAL GROUP | Facility: MEDICAL CENTER | Age: 43
End: 2023-09-26
Payer: COMMERCIAL

## 2023-09-26 ENCOUNTER — HOSPITAL ENCOUNTER (OUTPATIENT)
Dept: RADIOLOGY | Facility: MEDICAL CENTER | Age: 43
End: 2023-09-26
Attending: FAMILY MEDICINE
Payer: COMMERCIAL

## 2023-09-26 VITALS
HEART RATE: 78 BPM | TEMPERATURE: 98.8 F | BODY MASS INDEX: 35.44 KG/M2 | SYSTOLIC BLOOD PRESSURE: 120 MMHG | WEIGHT: 200 LBS | HEIGHT: 63 IN | DIASTOLIC BLOOD PRESSURE: 62 MMHG | OXYGEN SATURATION: 99 %

## 2023-09-26 DIAGNOSIS — J45.40 MODERATE PERSISTENT ASTHMA WITHOUT COMPLICATION: ICD-10-CM

## 2023-09-26 DIAGNOSIS — Z13.1 SCREENING FOR DIABETES MELLITUS: ICD-10-CM

## 2023-09-26 DIAGNOSIS — Z13.0 SCREENING FOR DEFICIENCY ANEMIA: ICD-10-CM

## 2023-09-26 DIAGNOSIS — R05.1 ACUTE COUGH: ICD-10-CM

## 2023-09-26 DIAGNOSIS — Z13.220 ENCOUNTER FOR LIPID SCREENING FOR CARDIOVASCULAR DISEASE: ICD-10-CM

## 2023-09-26 DIAGNOSIS — Z13.29 THYROID DISORDER SCREENING: ICD-10-CM

## 2023-09-26 DIAGNOSIS — Z13.6 ENCOUNTER FOR LIPID SCREENING FOR CARDIOVASCULAR DISEASE: ICD-10-CM

## 2023-09-26 PROCEDURE — 3078F DIAST BP <80 MM HG: CPT | Performed by: FAMILY MEDICINE

## 2023-09-26 PROCEDURE — 71046 X-RAY EXAM CHEST 2 VIEWS: CPT

## 2023-09-26 PROCEDURE — 3074F SYST BP LT 130 MM HG: CPT | Performed by: FAMILY MEDICINE

## 2023-09-26 PROCEDURE — 99214 OFFICE O/P EST MOD 30 MIN: CPT | Performed by: FAMILY MEDICINE

## 2023-09-26 RX ORDER — MONTELUKAST SODIUM 10 MG/1
1 TABLET ORAL
COMMUNITY

## 2023-09-26 RX ORDER — METHYLPREDNISOLONE 4 MG/1
TABLET ORAL
Qty: 21 TABLET | Refills: 0 | Status: SHIPPED | OUTPATIENT
Start: 2023-09-26 | End: 2024-03-14

## 2023-09-26 RX ORDER — NORETHINDRONE ACETATE AND ETHINYL ESTRADIOL, ETHINYL ESTRADIOL AND FERROUS FUMARATE 1MG-10(24)
KIT ORAL
COMMUNITY
Start: 2023-09-19

## 2023-09-26 NOTE — PATIENT INSTRUCTIONS
Started Steroids today and complete x ray.  Try Flonase and over the counter antihistamine to help dry up your nasal passages.

## 2023-09-27 NOTE — ASSESSMENT & PLAN NOTE
Chronic, unstable.  Concern for possible flare secondary to viral infection.  Will prescribe patient with a Medrol Dosepak.  Okay to continue albuterol 108 mcg/ACT aerosol solution inhalation 2 puffs every 4 hours as needed for shortness of breath.

## 2023-09-27 NOTE — PROGRESS NOTES
Sarah Orozco is a pleasant 43 y.o. female here for   Chief Complaint   Patient presents with    Coronavirus Screening     Went to  on Friday was tested, neg      HPI:   Problem   Acute Cough    Started 2 weeks ago with Viral illness symptoms such as dizziness, fatigue, difficulty with breathing.  Wheezing, cough, chest congestion, nasal congestion and rhinorrhea.  States when she went to the urgent care 09/15/2023 approximately 1 week ago she was experiencing some wheezing symptoms in which she got a breathing treatment.  She was also prescribed a short course of steroids but no improvement in her symptoms.  Tested negative for COVID at that visit.  Currently her cough is on going, worse at night, but also coughing during the day.  Dry cough, but getting clear phlem.  Unsure if the cough is stemming from her lungs or if it is coming from a constant throat irritation.  + chest congestion, dizziness and fatigue symptoms that have continued.  Currently taking montelukast, but no change in her symptoms  Denies any new or worsening symptoms, no fevers.     Moderate Persistent Asthma Without Complication    Using albuterol inhaler in the event that she does get discharge from shortness of breath.  Recent illness which required her to get a neb treatment in the urgent care.  Did try a short course of steroids with no improvement in her cough.          Current Medicines (including changes today)  Current Outpatient Medications   Medication Sig Dispense Refill    methylPREDNISolone (MEDROL DOSEPAK) 4 MG Tablet Therapy Pack As directed on the packaging label. 21 Tablet 0    LO LOESTRIN FE 1 MG-10 MCG / 10 MCG Tab       montelukast (SINGULAIR) 10 MG Tab Take 1 Tablet by mouth every day.      ofloxacin (OCUFLOX) 0.3 % Solution PLACE 2 DROPS 4 TIMES A DAY UNTIL DIRECTED TO STOP. X 7 DAYS (Patient not taking: Reported on 9/15/2023)      albuterol (PROVENTIL) 2.5mg/3ml Nebu Soln solution for nebulization Take 3 mL by  "nebulization every four hours as needed for Shortness of Breath. 90 mL 0    citalopram (CELEXA) 20 MG Tab TAKE 1 TABLET BY MOUTH EVERY  Tablet 3    EUCRISA 2 % Ointment       clobetasol (TEMOVATE) 0.05 % Cream APPLY TO EFFECTED AREA EXTERNALLY TWICE A DAY 10 DAY(S)      albuterol 108 (90 Base) MCG/ACT Aero Soln inhalation aerosol Inhale 2 Puffs every 6 hours as needed for Shortness of Breath. 8.5 g 11    ketoconazole (NIZORAL) 2 % Cream Apply 1 Application topically every day. 30 g 0    hydrocortisone 2.5 % Cream topical cream Apply 1 Application topically 2 times a day. 20 g 0    naproxen (NAPROSYN) 500 MG Tab       fluticasone (FLONASE) 50 MCG/ACT nasal spray       Ibuprofen (ADVIL PO) Take  by mouth.       No current facility-administered medications for this visit.     Past Medical/ Surgical History  She  has a past medical history of Asthma.  She  has a past surgical history that includes dental extraction(s).     Objective:     /62 (BP Location: Left arm, Patient Position: Sitting, BP Cuff Size: Adult)   Pulse 78   Temp 37.1 °C (98.8 °F) (Temporal)   Ht 1.6 m (5' 3\")   Wt 90.7 kg (200 lb)   SpO2 99%  Body mass index is 35.43 kg/m².    Physical Exam  Constitutional:       General: She is not in acute distress.     Appearance: She is not ill-appearing or toxic-appearing.   HENT:      Head: Normocephalic.      Right Ear: Tympanic membrane and external ear normal.      Left Ear: Tympanic membrane and external ear normal.      Nose: Nose normal. No rhinorrhea.      Mouth/Throat:      Mouth: Mucous membranes are moist.      Pharynx: Oropharynx is clear. Posterior oropharyngeal erythema (+PND) present.   Eyes:      General:         Right eye: No discharge.         Left eye: No discharge.      Conjunctiva/sclera: Conjunctivae normal.      Pupils: Pupils are equal, round, and reactive to light.   Cardiovascular:      Rate and Rhythm: Normal rate and regular rhythm.      Heart sounds: No murmur " heard.  Pulmonary:      Effort: Pulmonary effort is normal. No respiratory distress.      Breath sounds: Normal breath sounds. No wheezing or rhonchi.   Abdominal:      General: Abdomen is flat.   Musculoskeletal:         General: No swelling or tenderness.      Cervical back: Normal range of motion and neck supple.      Right lower leg: No edema.      Left lower leg: No edema.   Skin:     General: Skin is warm.   Neurological:      General: No focal deficit present.      Mental Status: She is alert and oriented to person, place, and time. Mental status is at baseline.   Psychiatric:         Mood and Affect: Mood normal.          Imaging:  No new imaging    Labs  No updated labs    Assessment and Plan:   The following treatment plan was discussed     Problem List Items Addressed This Visit       Moderate persistent asthma without complication     Chronic, unstable.  Concern for possible flare secondary to viral infection.  Will prescribe patient with a Medrol Dosepak.  Okay to continue albuterol 108 mcg/ACT aerosol solution inhalation 2 puffs every 4 hours as needed for shortness of breath.         Relevant Medications    montelukast (SINGULAIR) 10 MG Tab    methylPREDNISolone (MEDROL DOSEPAK) 4 MG Tablet Therapy Pack    Other Relevant Orders    DX-CHEST-2 VIEWS    Acute cough     Acute.  Physical exam: Dry cough noted during the visit.  Lung sounds are clear throughout, no wheezing.  Positive for mild oropharynx erythema, positive cobblestoning.  Reviewed urgent care note any testing.  Suspect cough is coming from more throat irritation secondary to postnasal drip.  Recommended trying Flonase and an over-the-counter antihistamine to help dry up secretions.  We will do a Medrol Dosepak just to be safe that is not coming from the lungs.  Also recommended a chest x-ray due to patient's asthma history and high risk for pneumonia.         Relevant Medications    methylPREDNISolone (MEDROL DOSEPAK) 4 MG Tablet Therapy  Pack     Other Visit Diagnoses       Screening for diabetes mellitus        Relevant Orders    Comp Metabolic Panel    ESTIMATED GFR    HEMOGLOBIN A1C    Screening for deficiency anemia        Relevant Orders    CBC WITH DIFFERENTIAL    Encounter for lipid screening for cardiovascular disease        Relevant Orders    Lipid Profile    Thyroid disorder screening        Relevant Orders    TSH+FREE T4             Followup: Return if symptoms worsen or fail to improve.    Please note that this dictation was created using voice recognition software. I have made every reasonable attempt to correct obvious errors, but I expect that there are errors of grammar and possibly content that I did not discover before finalizing the note.

## 2023-09-27 NOTE — ASSESSMENT & PLAN NOTE
Acute.  Physical exam: Dry cough noted during the visit.  Lung sounds are clear throughout, no wheezing.  Positive for mild oropharynx erythema, positive cobblestoning.  Reviewed urgent care note any testing.  Suspect cough is coming from more throat irritation secondary to postnasal drip.  Recommended trying Flonase and an over-the-counter antihistamine to help dry up secretions.  We will do a Medrol Dosepak just to be safe that is not coming from the lungs.  Also recommended a chest x-ray due to patient's asthma history and high risk for pneumonia.

## 2023-10-09 ENCOUNTER — APPOINTMENT (OUTPATIENT)
Dept: RADIOLOGY | Facility: MEDICAL CENTER | Age: 43
End: 2023-10-09
Attending: OBSTETRICS & GYNECOLOGY
Payer: COMMERCIAL

## 2023-10-09 DIAGNOSIS — Z12.31 SCREENING MAMMOGRAM, ENCOUNTER FOR: ICD-10-CM

## 2023-10-09 PROCEDURE — 77063 BREAST TOMOSYNTHESIS BI: CPT

## 2023-12-30 LAB
ALBUMIN SERPL-MCNC: 4.3 G/DL (ref 3.9–4.9)
ALBUMIN/GLOB SERPL: 1.7 {RATIO} (ref 1.2–2.2)
ALP SERPL-CCNC: 50 IU/L (ref 44–121)
ALT SERPL-CCNC: 14 IU/L (ref 0–32)
AST SERPL-CCNC: 12 IU/L (ref 0–40)
BASOPHILS # BLD AUTO: 0 X10E3/UL (ref 0–0.2)
BASOPHILS NFR BLD AUTO: 1 %
BILIRUB SERPL-MCNC: 0.3 MG/DL (ref 0–1.2)
BUN SERPL-MCNC: 10 MG/DL (ref 6–24)
BUN/CREAT SERPL: 13 (ref 9–23)
CALCIUM SERPL-MCNC: 8.5 MG/DL (ref 8.7–10.2)
CHLORIDE SERPL-SCNC: 106 MMOL/L (ref 96–106)
CHOLEST SERPL-MCNC: 175 MG/DL (ref 100–199)
CO2 SERPL-SCNC: 21 MMOL/L (ref 20–29)
CREAT SERPL-MCNC: 0.77 MG/DL (ref 0.57–1)
EGFRCR SERPLBLD CKD-EPI 2021: 99 ML/MIN/1.73
EOSINOPHIL # BLD AUTO: 0.1 X10E3/UL (ref 0–0.4)
EOSINOPHIL NFR BLD AUTO: 3 %
ERYTHROCYTE [DISTWIDTH] IN BLOOD BY AUTOMATED COUNT: 11.8 % (ref 11.7–15.4)
ERYTHROCYTE [SEDIMENTATION RATE] IN BLOOD BY WESTERGREN METHOD: 2 MM/HR (ref 0–32)
GLOBULIN SER CALC-MCNC: 2.5 G/DL (ref 1.5–4.5)
GLUCOSE SERPL-MCNC: 86 MG/DL (ref 70–99)
HBA1C MFR BLD: 5.2 % (ref 4.8–5.6)
HCT VFR BLD AUTO: 41.1 % (ref 34–46.6)
HDLC SERPL-MCNC: 53 MG/DL
HGB BLD-MCNC: 14.1 G/DL (ref 11.1–15.9)
IMM GRANULOCYTES # BLD AUTO: 0 X10E3/UL (ref 0–0.1)
IMM GRANULOCYTES NFR BLD AUTO: 0 %
IMMATURE CELLS  115398: NORMAL
LABORATORY COMMENT REPORT: ABNORMAL
LDLC SERPL CALC-MCNC: 91 MG/DL (ref 0–99)
LYMPHOCYTES # BLD AUTO: 1.9 X10E3/UL (ref 0.7–3.1)
LYMPHOCYTES NFR BLD AUTO: 36 %
MCH RBC QN AUTO: 30.8 PG (ref 26.6–33)
MCHC RBC AUTO-ENTMCNC: 34.3 G/DL (ref 31.5–35.7)
MCV RBC AUTO: 90 FL (ref 79–97)
MONOCYTES # BLD AUTO: 0.3 X10E3/UL (ref 0.1–0.9)
MONOCYTES NFR BLD AUTO: 6 %
MORPHOLOGY BLD-IMP: NORMAL
NEUTROPHILS # BLD AUTO: 2.8 X10E3/UL (ref 1.4–7)
NEUTROPHILS NFR BLD AUTO: 54 %
NRBC BLD AUTO-RTO: NORMAL %
PLATELET # BLD AUTO: 287 X10E3/UL (ref 150–450)
POTASSIUM SERPL-SCNC: 4.5 MMOL/L (ref 3.5–5.2)
PROT SERPL-MCNC: 6.8 G/DL (ref 6–8.5)
RBC # BLD AUTO: 4.58 X10E6/UL (ref 3.77–5.28)
SODIUM SERPL-SCNC: 139 MMOL/L (ref 134–144)
TRIGL SERPL-MCNC: 179 MG/DL (ref 0–149)
VLDLC SERPL CALC-MCNC: 31 MG/DL (ref 5–40)
WBC # BLD AUTO: 5.1 X10E3/UL (ref 3.4–10.8)

## 2024-03-06 DIAGNOSIS — J45.40 MODERATE PERSISTENT ASTHMA WITHOUT COMPLICATION: ICD-10-CM

## 2024-03-07 RX ORDER — ALBUTEROL SULFATE 90 UG/1
2 AEROSOL, METERED RESPIRATORY (INHALATION) EVERY 6 HOURS PRN
Qty: 8.5 EACH | Refills: 11 | Status: SHIPPED | OUTPATIENT
Start: 2024-03-07

## 2024-03-07 NOTE — TELEPHONE ENCOUNTER
Received request via: Pharmacy    Was the patient seen in the last year in this department? Yes    Does the patient have an active prescription (recently filled or refills available) for medication(s) requested? No        Does the patient have care home Plus and need 100 day supply (blood pressure, diabetes and cholesterol meds only)? Patient does not have SCP

## 2024-03-14 ENCOUNTER — OFFICE VISIT (OUTPATIENT)
Dept: MEDICAL GROUP | Facility: MEDICAL CENTER | Age: 44
End: 2024-03-14
Payer: COMMERCIAL

## 2024-03-14 VITALS
BODY MASS INDEX: 34.16 KG/M2 | WEIGHT: 192.8 LBS | DIASTOLIC BLOOD PRESSURE: 80 MMHG | HEIGHT: 63 IN | RESPIRATION RATE: 16 BRPM | TEMPERATURE: 97.2 F | HEART RATE: 71 BPM | SYSTOLIC BLOOD PRESSURE: 122 MMHG

## 2024-03-14 DIAGNOSIS — Z23 IMMUNIZATION DUE: ICD-10-CM

## 2024-03-14 DIAGNOSIS — R10.13 EPIGASTRIC PAIN: ICD-10-CM

## 2024-03-14 PROCEDURE — 99214 OFFICE O/P EST MOD 30 MIN: CPT | Mod: 25 | Performed by: FAMILY MEDICINE

## 2024-03-14 PROCEDURE — 3079F DIAST BP 80-89 MM HG: CPT | Performed by: FAMILY MEDICINE

## 2024-03-14 PROCEDURE — 90686 IIV4 VACC NO PRSV 0.5 ML IM: CPT | Performed by: FAMILY MEDICINE

## 2024-03-14 PROCEDURE — 3074F SYST BP LT 130 MM HG: CPT | Performed by: FAMILY MEDICINE

## 2024-03-14 PROCEDURE — 90471 IMMUNIZATION ADMIN: CPT | Performed by: FAMILY MEDICINE

## 2024-03-14 ASSESSMENT — FIBROSIS 4 INDEX: FIB4 SCORE: 0.47

## 2024-03-14 ASSESSMENT — PATIENT HEALTH QUESTIONNAIRE - PHQ9: CLINICAL INTERPRETATION OF PHQ2 SCORE: 0

## 2024-03-14 NOTE — PROGRESS NOTES
Verbal consent was acquired by the patient to use [employer] ambient listening note generation during this visit:  Yes      Chief complaint::Diagnoses of Epigastric pain and Immunization due were pertinent to this visit.    Assessment and Plan:   The following treatment plan was discussed:     Assessment & Plan  1. Epigastric pain.  Acute condition. Treat at home with Prilosec 20 mg 30 minutes prior to the first meal of the day and 30 minutes prior to bedtime to see if it will improve symptoms. Low suspicion for possible gallstones, though I did order an ultrasound of right upper quadrant. If we are unable to figure out what is going on, I do recommend that the patient get an ultrasound.    2. Asthma.  Chronic, unstable.  She did note that her asthma was worsening, though I feel that this may have been related to some acid reflux symptoms. Continue with albuterol inhaler, montelukast, and daily steroid inhaler.    Follow-up  The patient will follow up as needed.  Sarah was seen today for abdominal pain.    Diagnoses and all orders for this visit:    Epigastric pain  -     US-RUQ; Future    Immunization due  -     INFLUENZA VACCINE QUAD INJ (PF)        Followup: Return if symptoms worsen or fail to improve.    I have placed vaccination orders.  The MA is preforming vaccination orders under the direction of Dr. Lees    Subjective/HPI:     HPI:    Sarah Orozco is a pleasant 43 y.o. female here for   Chief Complaint   Patient presents with    Abdominal Pain     Upper center, X3-4 weeks         History of Present Illness  The patient presents for evaluation of multiple medical concerns.    Every once in a while, she feels like she needs to throw up, which is pretty intense, but she has not thrown up yet. She has been having waves of nausea over the last few weeks. She thought it was gas, but it is not going away. Most of her pain is in the epigastric region. She feels like she has not eaten for a long time  and her stomach has been gurgling a lot. She has intermittent diarrhea and constipation. She has been doing Weight Watchers and has been cutting back on some other things, but the food she is eating is not healthier. She gets heartburn pretty bad on occasion, mostly after she eats something like tomatoes, spaghetti sauce, or chocolate. Sometimes at night, it can get pretty intense, but it is not all the time. She takes Prilosec, which seems to help. She has also used Tums. Her  had similar symptoms, but it lasted for 2 days. She has never had any issues with her gallbladder. She has never been diagnosed with hiatal hernia. She went to [facility] 3 weeks ago.    Her asthma seems to be flaring up quite a bit. She is taking montelukast and using her inhaler. It seems to be worse when she lays down at night, and she starts wheezing.   Her older sister had cholecystectomy.    Current Medicines (including changes today)  Current Outpatient Medications   Medication Sig Dispense Refill    albuterol 108 (90 Base) MCG/ACT Aero Soln inhalation aerosol INHALE 2 PUFFS BY MOUTH EVERY 6 HOURS AS NEEDED FOR SHORTNESS OF BREATH 8.5 Each 11    LO LOESTRIN FE 1 MG-10 MCG / 10 MCG Tab       montelukast (SINGULAIR) 10 MG Tab Take 1 Tablet by mouth every day.      ofloxacin (OCUFLOX) 0.3 % Solution PLACE 2 DROPS 4 TIMES A DAY UNTIL DIRECTED TO STOP. X 7 DAYS (Patient not taking: Reported on 9/15/2023)      albuterol (PROVENTIL) 2.5mg/3ml Nebu Soln solution for nebulization Take 3 mL by nebulization every four hours as needed for Shortness of Breath. 90 mL 0    citalopram (CELEXA) 20 MG Tab TAKE 1 TABLET BY MOUTH EVERY  Tablet 3    EUCRISA 2 % Ointment       clobetasol (TEMOVATE) 0.05 % Cream APPLY TO EFFECTED AREA EXTERNALLY TWICE A DAY 10 DAY(S)      ketoconazole (NIZORAL) 2 % Cream Apply 1 Application topically every day. 30 g 0    hydrocortisone 2.5 % Cream topical cream Apply 1 Application topically 2 times a day. 20 g  "0    naproxen (NAPROSYN) 500 MG Tab       fluticasone (FLONASE) 50 MCG/ACT nasal spray       Ibuprofen (ADVIL PO) Take  by mouth.       No current facility-administered medications for this visit.     Past Medical/ Surgical History  She  has a past medical history of Asthma.  She  has a past surgical history that includes dental extraction(s).       Objective:   /80 (BP Location: Left arm, Patient Position: Sitting, BP Cuff Size: Adult)   Pulse 71   Temp 36.2 °C (97.2 °F) (Temporal)   Resp 16   Ht 1.6 m (5' 3\")   Wt 87.5 kg (192 lb 12.8 oz)  Body mass index is 34.15 kg/m².    Physical exam was made by observation   Physical Exam  Constitutional:       General: She is not in acute distress.  HENT:      Head: Normocephalic and atraumatic.   Eyes:      Conjunctiva/sclera: Conjunctivae normal.      Pupils: Pupils are equal, round, and reactive to light.   Pulmonary:      Effort: Pulmonary effort is normal. No respiratory distress.   Abdominal:      General: There is no distension.      Tenderness: There is abdominal tenderness in the epigastric area and left lower quadrant. There is no guarding. Negative signs include Hogue's sign.   Musculoskeletal:      Cervical back: Normal range of motion and neck supple.   Skin:     General: Skin is warm and dry.      Findings: No rash.   Neurological:      Mental Status: She is alert and oriented to person, place, and time.      Gait: Gait is intact.   Psychiatric:         Mood and Affect: Affect normal.          Lab/ Imaging Results:  No labs or imaging to review    Please note that this dictation was created using voice recognition software. I have made every reasonable attempt to correct obvious errors, but I expect that there are errors of grammar and possibly content that I did not discover before finalizing the note.      "

## 2024-04-05 ENCOUNTER — APPOINTMENT (OUTPATIENT)
Dept: RADIOLOGY | Facility: MEDICAL CENTER | Age: 44
End: 2024-04-05
Attending: FAMILY MEDICINE
Payer: COMMERCIAL

## 2024-05-03 ENCOUNTER — OFFICE VISIT (OUTPATIENT)
Dept: URGENT CARE | Facility: CLINIC | Age: 44
End: 2024-05-03
Payer: COMMERCIAL

## 2024-05-03 VITALS
DIASTOLIC BLOOD PRESSURE: 86 MMHG | RESPIRATION RATE: 14 BRPM | HEART RATE: 92 BPM | TEMPERATURE: 96.9 F | BODY MASS INDEX: 34.23 KG/M2 | SYSTOLIC BLOOD PRESSURE: 126 MMHG | OXYGEN SATURATION: 97 % | WEIGHT: 186 LBS | HEIGHT: 62 IN

## 2024-05-03 DIAGNOSIS — B96.89 BACTERIAL SINUSITIS: ICD-10-CM

## 2024-05-03 DIAGNOSIS — J32.9 BACTERIAL SINUSITIS: ICD-10-CM

## 2024-05-03 DIAGNOSIS — Z87.09 HISTORY OF ASTHMA: ICD-10-CM

## 2024-05-03 DIAGNOSIS — Z76.0 MEDICATION REFILL: ICD-10-CM

## 2024-05-03 PROCEDURE — 99214 OFFICE O/P EST MOD 30 MIN: CPT

## 2024-05-03 PROCEDURE — 3074F SYST BP LT 130 MM HG: CPT

## 2024-05-03 PROCEDURE — 3079F DIAST BP 80-89 MM HG: CPT

## 2024-05-03 RX ORDER — AMOXICILLIN AND CLAVULANATE POTASSIUM 875; 125 MG/1; MG/1
1 TABLET, FILM COATED ORAL 2 TIMES DAILY
Qty: 14 TABLET | Refills: 0 | Status: SHIPPED | OUTPATIENT
Start: 2024-05-03 | End: 2024-05-10

## 2024-05-03 RX ORDER — AMOXICILLIN 500 MG/1
500 CAPSULE ORAL 2 TIMES DAILY
Qty: 20 CAPSULE | Refills: 0 | Status: SHIPPED | OUTPATIENT
Start: 2024-05-03 | End: 2024-05-03

## 2024-05-03 RX ORDER — ALBUTEROL SULFATE 90 UG/1
2 AEROSOL, METERED RESPIRATORY (INHALATION) EVERY 6 HOURS PRN
Qty: 8.5 G | Refills: 0 | Status: SHIPPED | OUTPATIENT
Start: 2024-05-03

## 2024-05-03 RX ORDER — PREDNISONE 20 MG/1
TABLET ORAL
COMMUNITY
Start: 2024-05-03

## 2024-05-03 ASSESSMENT — FIBROSIS 4 INDEX: FIB4 SCORE: 0.47

## 2024-05-03 NOTE — PROGRESS NOTES
Subjective:   Sarah Orozco is a 43 y.o. female who presents for Cough (X 8 days, cough, wheezing.)      HPI:    Patient with asthma presents urgent care with concerns of shortness of breath, wheezing, chest tightness.  Reports onset of symptoms was approximately 8 days ago.    She endorses preceding upper respiratory symptoms of runny nose, nasal congestion, cough.    Reports normal oral intake and urinary output  Reports interrupted sleep due to coughing and wheezing  Has had increased use of albuterol.  States it  works for an hour and then she started to have to have more coughing and wheezing.  Patient states she is compliant with Symbicort, montelukast, and Flonase, nasal rinses.  Denies hospitalization for asthma last 12 months  Report slightly reduced activity tolerance   Had teledoc appointment last night and they prescribed a steroid. She has not picked it up yet.    ROS As above in HPI    Medications:    Current Outpatient Medications on File Prior to Visit   Medication Sig Dispense Refill    LO LOESTRIN FE 1 MG-10 MCG / 10 MCG Tab       citalopram (CELEXA) 20 MG Tab TAKE 1 TABLET BY MOUTH EVERY  Tablet 3    ketoconazole (NIZORAL) 2 % Cream Apply 1 Application topically every day. 30 g 0    hydrocortisone 2.5 % Cream topical cream Apply 1 Application topically 2 times a day. 20 g 0    naproxen (NAPROSYN) 500 MG Tab       Ibuprofen (ADVIL PO) Take  by mouth.      predniSONE (DELTASONE) 20 MG Tab  (Patient not taking: Reported on 5/3/2024)      montelukast (SINGULAIR) 10 MG Tab Take 1 Tablet by mouth every day. (Patient not taking: Reported on 5/3/2024)      ofloxacin (OCUFLOX) 0.3 % Solution PLACE 2 DROPS 4 TIMES A DAY UNTIL DIRECTED TO STOP. X 7 DAYS (Patient not taking: Reported on 9/15/2023)      EUCRISA 2 % Ointment  (Patient not taking: Reported on 5/3/2024)      clobetasol (TEMOVATE) 0.05 % Cream APPLY TO EFFECTED AREA EXTERNALLY TWICE A DAY 10 DAY(S) (Patient not taking: Reported on  "5/3/2024)      fluticasone (FLONASE) 50 MCG/ACT nasal spray  (Patient not taking: Reported on 5/3/2024)       No current facility-administered medications on file prior to visit.        Allergies:   Patient has no known allergies.    Problem List:   Patient Active Problem List   Diagnosis    Moderate persistent asthma without complication    Vitamin D insufficiency    Family history of hemochromatosis    Tiredness    Major depressive disorder    Obesity (BMI 30-39.9)    Nausea    Diarrhea of presumed infectious origin    Right forearm pain    Finger pain, left    Yeast infection of the skin    Acute cough    Nasal congestion    Head lump        Surgical History:  Past Surgical History:   Procedure Laterality Date    DENTAL EXTRACTION(S)      wisdom teeth       Past Social Hx:   Social History     Tobacco Use    Smoking status: Never    Smokeless tobacco: Never   Vaping Use    Vaping Use: Never used   Substance Use Topics    Alcohol use: Yes     Comment: rare    Drug use: No          Problem list, medications, and allergies reviewed by myself today in Epic.     Objective:     /86   Pulse 92   Temp 36.1 °C (96.9 °F) (Temporal)   Resp 14   Ht 1.575 m (5' 2\")   Wt 84.4 kg (186 lb)   SpO2 97%   BMI 34.02 kg/m²     Physical Exam  Vitals and nursing note reviewed.   Constitutional:       General: She is not in acute distress.     Appearance: Normal appearance. She is not ill-appearing.   HENT:      Head: Normocephalic.      Right Ear: Tympanic membrane and ear canal normal.      Left Ear: Tympanic membrane and ear canal normal.      Nose: Congestion and rhinorrhea present. Rhinorrhea is clear and purulent.      Right Sinus: Maxillary sinus tenderness and frontal sinus tenderness present.      Left Sinus: Maxillary sinus tenderness and frontal sinus tenderness present.      Mouth/Throat:      Mouth: Mucous membranes are moist.      Pharynx: Oropharynx is clear. Uvula midline. Posterior oropharyngeal erythema " (Mod PND) present. No pharyngeal swelling or oropharyngeal exudate.      Tonsils: No tonsillar exudate.   Cardiovascular:      Rate and Rhythm: Normal rate and regular rhythm.      Heart sounds: Normal heart sounds. No murmur heard.     No friction rub. No gallop.   Pulmonary:      Effort: Pulmonary effort is normal. No respiratory distress.      Breath sounds: Normal breath sounds. No stridor. No decreased breath sounds, wheezing, rhonchi or rales.   Chest:      Chest wall: No tenderness.   Abdominal:      General: Bowel sounds are normal.      Palpations: Abdomen is soft.   Musculoskeletal:      Cervical back: No rigidity or tenderness.   Lymphadenopathy:      Cervical: No cervical adenopathy.   Skin:     General: Skin is warm and dry.      Capillary Refill: Capillary refill takes less than 2 seconds.      Findings: No rash.   Neurological:      Mental Status: She is alert and oriented to person, place, and time.         Assessment/Plan:       Diagnosis and associated orders:   1. Bacterial sinusitis  - amoxicillin-clavulanate (AUGMENTIN) 875-125 MG Tab; Take 1 Tablet by mouth 2 times a day for 7 days.  Dispense: 14 Tablet; Refill: 0    2. History of asthma  - albuterol 108 (90 Base) MCG/ACT Aero Soln inhalation aerosol; Inhale 2 Puffs every 6 hours as needed for Shortness of Breath.  Dispense: 8.5 g; Refill: 0    3. Medication refill    Other orders  - predniSONE (DELTASONE) 20 MG Tab;  (Patient not taking: Reported on 5/3/2024)          Comments/MDM:     Patient not currently having an asthma exacerbation, however she likely has more asthma activity related to persistent postnasal drip.  She has unsuccessfully tried antihistamines, Flonase, nasal rinses, warm showers.  She is compliant with her Symbicort inhaler, and reports increased use of albuterol due to wheezing, increased coughing.  Symptoms that are present for 8 days.  Will start her on Augmentin for bacterial sinus infection.  She is encouraged to  continue with supportive care measures which include items discussed above.  She has not picked up the steroid pack ordered by [facility] last night.  Courage patient to  the steroid pack.  Discussed use of Mucinex.  Follow-up with primary care encouraged for spirometry.  Return to urgent care should symptoms fail to improve.       Return to clinic or go to ED if symptoms worsen or persist. Indications for ED discussed at length. Patient/Parent/Guardian voices understanding. Follow-up with your primary care provider in 3-5 days. Red flag symptoms discussed. All side effects of medication discussed including allergic response, GI upset, tendon injury, rash, sedation etc.    Please note that this dictation was created using voice recognition software. I have made a reasonable attempt to correct obvious errors, but I expect that there are errors of grammar and possibly content that I did not discover before finalizing the note.    This note was electronically signed by GEOVANNI Lawrence

## 2024-05-29 ENCOUNTER — HOSPITAL ENCOUNTER (OUTPATIENT)
Dept: RADIOLOGY | Facility: MEDICAL CENTER | Age: 44
End: 2024-05-29
Attending: FAMILY MEDICINE
Payer: COMMERCIAL

## 2024-05-29 DIAGNOSIS — R10.13 EPIGASTRIC PAIN: ICD-10-CM

## 2024-08-15 ENCOUNTER — APPOINTMENT (OUTPATIENT)
Dept: MEDICAL GROUP | Facility: MEDICAL CENTER | Age: 44
End: 2024-08-15
Payer: COMMERCIAL

## 2024-08-15 VITALS
SYSTOLIC BLOOD PRESSURE: 110 MMHG | TEMPERATURE: 98.6 F | WEIGHT: 190 LBS | HEIGHT: 62 IN | OXYGEN SATURATION: 96 % | DIASTOLIC BLOOD PRESSURE: 84 MMHG | HEART RATE: 69 BPM | RESPIRATION RATE: 18 BRPM | BODY MASS INDEX: 34.96 KG/M2

## 2024-08-15 DIAGNOSIS — J45.40 MODERATE PERSISTENT ASTHMA WITHOUT COMPLICATION: ICD-10-CM

## 2024-08-15 DIAGNOSIS — F33.1 MODERATE EPISODE OF RECURRENT MAJOR DEPRESSIVE DISORDER (HCC): ICD-10-CM

## 2024-08-15 DIAGNOSIS — R53.82 CHRONIC FATIGUE: ICD-10-CM

## 2024-08-15 PROCEDURE — 3074F SYST BP LT 130 MM HG: CPT | Performed by: FAMILY MEDICINE

## 2024-08-15 PROCEDURE — 99214 OFFICE O/P EST MOD 30 MIN: CPT | Performed by: FAMILY MEDICINE

## 2024-08-15 PROCEDURE — 3079F DIAST BP 80-89 MM HG: CPT | Performed by: FAMILY MEDICINE

## 2024-08-15 RX ORDER — CETIRIZINE HYDROCHLORIDE 10 MG/1
10 TABLET ORAL DAILY
COMMUNITY

## 2024-08-15 RX ORDER — CLOBETASOL PROPIONATE 0.5 MG/G
CREAM TOPICAL 2 TIMES DAILY
COMMUNITY

## 2024-08-15 RX ORDER — CITALOPRAM HYDROBROMIDE 10 MG/1
TABLET ORAL
Qty: 21 TABLET | Refills: 0 | Status: SHIPPED | OUTPATIENT
Start: 2024-08-15 | End: 2024-09-12

## 2024-08-15 RX ORDER — MONTELUKAST SODIUM 10 MG/1
10 TABLET ORAL
Qty: 90 TABLET | Refills: 3 | Status: SHIPPED | OUTPATIENT
Start: 2024-08-15

## 2024-08-15 ASSESSMENT — FIBROSIS 4 INDEX: FIB4 SCORE: 0.48

## 2024-08-15 NOTE — PROGRESS NOTES
Verbal consent was acquired by the patient to use [employer] ambient listening note generation during this visit:  Yes      Chief complaint::Diagnoses of Moderate episode of recurrent major depressive disorder (HCC), Moderate persistent asthma without complication, and Chronic fatigue were pertinent to this visit.    Assessment and Plan:   The following treatment plan was discussed:     Assessment & Plan  1. Depression.  She reports feeling tired all the time and wishes to stop taking Celexa. She currently takes 20 mg in the evening. A plan to wean her off Celexa was discussed. She will reduce the dose to 10 mg once daily for 2 weeks, then to 5 mg for another 2 weeks, and then stop the medication. A new prescription for Celexa 10 mg was provided. Potential withdrawal symptoms were discussed. If her depression worsens, an alternative antidepressant like Prozac, which also works for binge eating disorder, may be considered.    2. Moderate persistent asthma.  She requested a refill of montelukast, which she has not been taking but wishes to restart. The prescription was sent to her preferred pharmacy.    3. Chronic fatigue.  She was advised to slowly increase her physical activity as tolerated. She mentioned getting 12,000 steps a day at her job but was encouraged to incorporate more structured exercise like jogging or walking.      Sarah was seen today for medication follow-up and fatigue.    Diagnoses and all orders for this visit:    Moderate episode of recurrent major depressive disorder (HCC)  -     citalopram (CELEXA) 10 MG tablet; Take 1 Tablet by mouth every day for 14 days, THEN 0.5 Tablets every day for 14 days.    Moderate persistent asthma without complication  -     montelukast (SINGULAIR) 10 MG Tab; Take 1 Tablet by mouth every day.    Chronic fatigue  -     citalopram (CELEXA) 10 MG tablet; Take 1 Tablet by mouth every day for 14 days, THEN 0.5 Tablets every day for 14 days.        Followup: Return if  symptoms worsen or fail to improve.    Subjective/HPI:     HPI:    Sarah Orozco is a pleasant 44 y.o. female here for   Chief Complaint   Patient presents with    Medication Follow-up    Fatigue        History of Present Illness  The patient is a 44-year-old female who is here to discuss chronic fatigue and review her medications.    She reports feeling constantly tired and exhausted, attributing it partially to her job as an . Despite her fatigue, she manages to achieve 12,000 steps per day due to the active nature of her job. She identifies as a stress eater and acknowledges the need for weight loss, which she finds challenging. She started taking Celexa six years ago following her mother's death. She is currently taking Celexa in the evening but has previously discontinued it due to side effects such as lightheadedness. Her sister, who was also on Celexa, experienced similar symptoms but found relief after switching to a different antidepressant.    She has not been taking montelukast recently but expresses a desire to resume its use and requests a prescription refill.    She is using clobetasol cream, which was prescribed by her OBGYN. Additionally, she uses Eucerin cream and Flonase as needed. Her dermatologist had mixed hydrocortisone cream and ketoconazole for severe rashes under her breast, which she applies together, and it helps. She takes ibuprofen and naproxen as needed. She is on Loestrin. She has been taking Zyrtec and an over-the-counter antacid consistently for about a month.    Current Medicines (including changes today)  Current Outpatient Medications   Medication Sig Dispense Refill    clobetasol (TEMOVATE) 0.05 % Cream Apply  topically 2 times a day.      cetirizine (ZYRTEC) 10 MG Tab Take 10 mg by mouth every day.      montelukast (SINGULAIR) 10 MG Tab Take 1 Tablet by mouth every day. 90 Tablet 3    citalopram (CELEXA) 10 MG tablet Take 1 Tablet by mouth every day for 14  "days, THEN 0.5 Tablets every day for 14 days. 21 Tablet 0    EUCRISA 2 % Ointment       fluticasone (FLONASE) 50 MCG/ACT nasal spray       albuterol 108 (90 Base) MCG/ACT Aero Soln inhalation aerosol Inhale 2 Puffs every 6 hours as needed for Shortness of Breath. 8.5 g 0    LO LOESTRIN FE 1 MG-10 MCG / 10 MCG Tab       ketoconazole (NIZORAL) 2 % Cream Apply 1 Application topically every day. 30 g 0    hydrocortisone 2.5 % Cream topical cream Apply 1 Application topically 2 times a day. 20 g 0    naproxen (NAPROSYN) 500 MG Tab       Ibuprofen (ADVIL PO) Take  by mouth.       No current facility-administered medications for this visit.     Past Medical/ Surgical History  She  has a past medical history of Asthma.  She  has a past surgical history that includes dental extraction(s).       Objective:   /84 (BP Location: Left arm, Patient Position: Sitting, BP Cuff Size: Adult)   Pulse 69   Temp 37 °C (98.6 °F) (Temporal)   Resp 18   Ht 1.575 m (5' 2\")   Wt 86.2 kg (190 lb)   SpO2 96%  Body mass index is 34.75 kg/m².    Physical exam was made by observation   Physical Exam  Constitutional:       General: She is not in acute distress.  HENT:      Head: Normocephalic and atraumatic.   Eyes:      Conjunctiva/sclera: Conjunctivae normal.      Pupils: Pupils are equal, round, and reactive to light.   Pulmonary:      Effort: Pulmonary effort is normal. No respiratory distress.   Abdominal:      General: There is no distension.   Musculoskeletal:      Cervical back: Normal range of motion and neck supple.   Skin:     General: Skin is warm and dry.      Findings: No rash.   Neurological:      Mental Status: She is alert and oriented to person, place, and time.      Gait: Gait is intact.   Psychiatric:         Mood and Affect: Affect normal.          Lab/ Imaging Results:  No labs or imaging to review    Please note that this dictation was created using voice recognition software. I have made every reasonable attempt " to correct obvious errors, but I expect that there are errors of grammar and possibly content that I did not discover before finalizing the note.

## 2024-09-05 DIAGNOSIS — Z87.09 HISTORY OF ASTHMA: ICD-10-CM

## 2024-09-07 DIAGNOSIS — R53.82 CHRONIC FATIGUE: ICD-10-CM

## 2024-09-07 DIAGNOSIS — F33.1 MODERATE EPISODE OF RECURRENT MAJOR DEPRESSIVE DISORDER (HCC): ICD-10-CM

## 2024-09-09 RX ORDER — CITALOPRAM HYDROBROMIDE 10 MG/1
TABLET ORAL
Qty: 21 TABLET | Refills: 0 | Status: SHIPPED | OUTPATIENT
Start: 2024-09-09 | End: 2024-10-07

## 2024-09-09 NOTE — TELEPHONE ENCOUNTER
Pharmacy comment: REQUEST FOR 90 DAYS PRESCRIPTION. DX Code Needed     Received request via: Pharmacy    Was the patient seen in the last year in this department? Yes    Does the patient have an active prescription (recently filled or refills available) for medication(s) requested? No    [facility]    Does the patient have residential Plus and need 100-day supply? (This applies to ALL medications) Patient does not have SCP

## 2024-10-15 ENCOUNTER — OFFICE VISIT (OUTPATIENT)
Dept: MEDICAL GROUP | Facility: MEDICAL CENTER | Age: 44
End: 2024-10-15
Payer: COMMERCIAL

## 2024-10-15 VITALS
HEIGHT: 62 IN | DIASTOLIC BLOOD PRESSURE: 72 MMHG | HEART RATE: 85 BPM | WEIGHT: 193 LBS | SYSTOLIC BLOOD PRESSURE: 124 MMHG | OXYGEN SATURATION: 98 % | BODY MASS INDEX: 35.51 KG/M2 | TEMPERATURE: 97.3 F

## 2024-10-15 DIAGNOSIS — Z87.09 HISTORY OF ASTHMA: ICD-10-CM

## 2024-10-15 DIAGNOSIS — M72.2 PLANTAR FASCIITIS: ICD-10-CM

## 2024-10-15 DIAGNOSIS — Z83.49 FAMILY HISTORY OF HEMOCHROMATOSIS: ICD-10-CM

## 2024-10-15 DIAGNOSIS — F41.1 GAD (GENERALIZED ANXIETY DISORDER): ICD-10-CM

## 2024-10-15 DIAGNOSIS — F33.1 MODERATE EPISODE OF RECURRENT MAJOR DEPRESSIVE DISORDER (HCC): ICD-10-CM

## 2024-10-15 DIAGNOSIS — R53.82 CHRONIC FATIGUE: ICD-10-CM

## 2024-10-15 PROCEDURE — 99214 OFFICE O/P EST MOD 30 MIN: CPT | Performed by: FAMILY MEDICINE

## 2024-10-15 PROCEDURE — 3074F SYST BP LT 130 MM HG: CPT | Performed by: FAMILY MEDICINE

## 2024-10-15 PROCEDURE — 3078F DIAST BP <80 MM HG: CPT | Performed by: FAMILY MEDICINE

## 2024-10-15 RX ORDER — ALBUTEROL SULFATE 90 UG/1
2 INHALANT RESPIRATORY (INHALATION) EVERY 6 HOURS PRN
Qty: 8.5 G | Refills: 2 | Status: SHIPPED | OUTPATIENT
Start: 2024-10-15

## 2024-10-15 RX ORDER — ALBUTEROL SULFATE 90 UG/1
INHALANT RESPIRATORY (INHALATION)
Qty: 8.5 EACH | OUTPATIENT
Start: 2024-10-15

## 2024-10-15 ASSESSMENT — FIBROSIS 4 INDEX: FIB4 SCORE: 0.48

## 2024-10-19 ENCOUNTER — APPOINTMENT (OUTPATIENT)
Dept: LAB | Facility: MEDICAL CENTER | Age: 44
End: 2024-10-19
Payer: COMMERCIAL

## 2024-11-06 ENCOUNTER — HOSPITAL ENCOUNTER (OUTPATIENT)
Dept: LAB | Facility: MEDICAL CENTER | Age: 44
End: 2024-11-06
Attending: FAMILY MEDICINE
Payer: COMMERCIAL

## 2024-11-06 DIAGNOSIS — R53.82 CHRONIC FATIGUE: ICD-10-CM

## 2024-11-06 DIAGNOSIS — Z83.49 FAMILY HISTORY OF HEMOCHROMATOSIS: ICD-10-CM

## 2024-11-06 LAB
25(OH)D3 SERPL-MCNC: 37 NG/ML (ref 30–100)
ALBUMIN SERPL BCP-MCNC: 4.2 G/DL (ref 3.2–4.9)
ALBUMIN/GLOB SERPL: 1.4 G/DL
ALP SERPL-CCNC: 64 U/L (ref 30–99)
ALT SERPL-CCNC: 9 U/L (ref 2–50)
ANION GAP SERPL CALC-SCNC: 12 MMOL/L (ref 7–16)
AST SERPL-CCNC: 13 U/L (ref 12–45)
BILIRUB SERPL-MCNC: 0.3 MG/DL (ref 0.1–1.5)
BUN SERPL-MCNC: 13 MG/DL (ref 8–22)
CALCIUM ALBUM COR SERPL-MCNC: 8.9 MG/DL (ref 8.5–10.5)
CALCIUM SERPL-MCNC: 9.1 MG/DL (ref 8.4–10.2)
CHLORIDE SERPL-SCNC: 101 MMOL/L (ref 96–112)
CO2 SERPL-SCNC: 23 MMOL/L (ref 20–33)
CREAT SERPL-MCNC: 0.77 MG/DL (ref 0.5–1.4)
CRP SERPL HS-MCNC: 1.31 MG/DL (ref 0–0.75)
ERYTHROCYTE [DISTWIDTH] IN BLOOD BY AUTOMATED COUNT: 39.4 FL (ref 35.9–50)
ERYTHROCYTE [SEDIMENTATION RATE] IN BLOOD BY WESTERGREN METHOD: 2 MM/HOUR (ref 0–25)
FASTING STATUS PATIENT QL REPORTED: NORMAL
FERRITIN SERPL-MCNC: 83.1 NG/ML (ref 10–291)
GFR SERPLBLD CREATININE-BSD FMLA CKD-EPI: 98 ML/MIN/1.73 M 2
GLOBULIN SER CALC-MCNC: 3 G/DL (ref 1.9–3.5)
GLUCOSE SERPL-MCNC: 90 MG/DL (ref 65–99)
HAV IGM SERPL QL IA: NORMAL
HBV CORE IGM SER QL: NORMAL
HBV SURFACE AG SER QL: NORMAL
HCT VFR BLD AUTO: 42.7 % (ref 37–47)
HCV AB SER QL: NORMAL
HGB BLD-MCNC: 14.7 G/DL (ref 12–16)
HIV 1+2 AB+HIV1 P24 AG SERPL QL IA: NORMAL
MCH RBC QN AUTO: 31.2 PG (ref 27–33)
MCHC RBC AUTO-ENTMCNC: 34.4 G/DL (ref 32.2–35.5)
MCV RBC AUTO: 90.7 FL (ref 81.4–97.8)
PLATELET # BLD AUTO: 295 K/UL (ref 164–446)
PMV BLD AUTO: 10 FL (ref 9–12.9)
POTASSIUM SERPL-SCNC: 4.2 MMOL/L (ref 3.6–5.5)
PROT SERPL-MCNC: 7.2 G/DL (ref 6–8.2)
RBC # BLD AUTO: 4.71 M/UL (ref 4.2–5.4)
SODIUM SERPL-SCNC: 136 MMOL/L (ref 135–145)
VIT B12 SERPL-MCNC: 358 PG/ML (ref 211–911)
WBC # BLD AUTO: 6.2 K/UL (ref 4.8–10.8)

## 2024-11-06 PROCEDURE — 36415 COLL VENOUS BLD VENIPUNCTURE: CPT

## 2024-11-06 PROCEDURE — 86038 ANTINUCLEAR ANTIBODIES: CPT

## 2024-11-06 PROCEDURE — 87389 HIV-1 AG W/HIV-1&-2 AB AG IA: CPT

## 2024-11-06 PROCEDURE — 86665 EPSTEIN-BARR CAPSID VCA: CPT

## 2024-11-06 PROCEDURE — 85652 RBC SED RATE AUTOMATED: CPT

## 2024-11-06 PROCEDURE — 85027 COMPLETE CBC AUTOMATED: CPT

## 2024-11-06 PROCEDURE — 80053 COMPREHEN METABOLIC PANEL: CPT

## 2024-11-06 PROCEDURE — 82728 ASSAY OF FERRITIN: CPT

## 2024-11-06 PROCEDURE — 86664 EPSTEIN-BARR NUCLEAR ANTIGEN: CPT

## 2024-11-06 PROCEDURE — 82607 VITAMIN B-12: CPT

## 2024-11-06 PROCEDURE — 82024 ASSAY OF ACTH: CPT

## 2024-11-06 PROCEDURE — 82306 VITAMIN D 25 HYDROXY: CPT

## 2024-11-06 PROCEDURE — 86140 C-REACTIVE PROTEIN: CPT

## 2024-11-06 PROCEDURE — 86663 EPSTEIN-BARR ANTIBODY: CPT

## 2024-11-06 PROCEDURE — 80074 ACUTE HEPATITIS PANEL: CPT

## 2024-11-07 LAB
ACTH PLAS-MCNC: 13.1 PG/ML (ref 7.2–63.3)
EBV EA-D IGG SER-ACNC: <5 U/ML (ref 0–10.9)
EBV NA IGG SER IA-ACNC: 487 U/ML (ref 0–21.9)
EBV VCA IGG SER IA-ACNC: 47.8 U/ML (ref 0–21.9)
EBV VCA IGM SER IA-ACNC: <10 U/ML (ref 0–43.9)
NUCLEAR IGG SER QL IA: NORMAL

## 2025-02-07 ENCOUNTER — OFFICE VISIT (OUTPATIENT)
Dept: MEDICAL GROUP | Facility: MEDICAL CENTER | Age: 45
End: 2025-02-07
Payer: COMMERCIAL

## 2025-02-07 VITALS
HEART RATE: 76 BPM | TEMPERATURE: 97.7 F | WEIGHT: 193 LBS | OXYGEN SATURATION: 96 % | SYSTOLIC BLOOD PRESSURE: 122 MMHG | HEIGHT: 62 IN | DIASTOLIC BLOOD PRESSURE: 84 MMHG | BODY MASS INDEX: 35.51 KG/M2

## 2025-02-07 DIAGNOSIS — R09.82 PND (POST-NASAL DRIP): ICD-10-CM

## 2025-02-07 DIAGNOSIS — R05.1 ACUTE COUGH: ICD-10-CM

## 2025-02-07 PROCEDURE — 3079F DIAST BP 80-89 MM HG: CPT | Performed by: FAMILY MEDICINE

## 2025-02-07 PROCEDURE — 99213 OFFICE O/P EST LOW 20 MIN: CPT | Performed by: FAMILY MEDICINE

## 2025-02-07 PROCEDURE — 3074F SYST BP LT 130 MM HG: CPT | Performed by: FAMILY MEDICINE

## 2025-02-07 RX ORDER — PREDNISONE 20 MG/1
TABLET ORAL
Qty: 15 TABLET | Refills: 0 | Status: SHIPPED | OUTPATIENT
Start: 2025-02-07 | End: 2025-02-14

## 2025-02-07 ASSESSMENT — FIBROSIS 4 INDEX: FIB4 SCORE: 0.63

## 2025-02-08 NOTE — PROGRESS NOTES
Verbal consent was acquired by the patient to use [employer] ambient listening note generation during this visit:  Yes      Chief complaint::Diagnoses of PND (post-nasal drip) and Acute cough were pertinent to this visit.    Assessment and Plan:   The following treatment plan was discussed:     Assessment & Plan  1.  Subacute postnasal drip: Symptoms likely due to postnasal drip with nasal passage swelling and throat mucus.  - Advised Flonase twice daily (2 sprays AM and PM)  - Prescribed prednisone taper: 3 tablets for 3 days, 2 tablets for 2 days, 1 tablet for 2 days  - Recommended Benadryl at night  - Advised daily vitamin D, vitamin C, and zinc    2.  Chronic neck rash, unstable: Persistent rash previously treated with Eucrisa.  - Prednisone taper may provide temporary relief  - Advised follow-up with dermatologist    Follow-up  - Follow-up with dermatologist for neck rash  Sarah was seen today for cough, headache and nasal congestion.    Diagnoses and all orders for this visit:    PND (post-nasal drip)  -     predniSONE (DELTASONE) 20 MG Tab; Take 3 Tablets by mouth every day for 3 days, THEN 2 Tablets every day for 2 days, THEN 1 Tablet every day for 2 days.    Acute cough  -     predniSONE (DELTASONE) 20 MG Tab; Take 3 Tablets by mouth every day for 3 days, THEN 2 Tablets every day for 2 days, THEN 1 Tablet every day for 2 days.        Followup: Return if symptoms worsen or fail to improve.    Subjective/HPI:     HPI:    Sarah Orozco is a pleasant 44 y.o. female here for   Chief Complaint   Patient presents with    Cough     Mucus, dry, 2 weeks    Headache     Left side, down to jaw    Nasal Congestion        History of Present Illness  The patient is a 44-year-old individual with a 2-week history of cough, headaches, and congestion. Symptoms initially improved but then worsened, with nocturnal cough disrupting sleep, dyspnea, mild chest tightness, rhinorrhea, sneezing, and watery eyes. They have been  "using DayQuil, NyQuil, and Mucinex with minimal relief, and occasionally Flonase. They experience postnasal drip and frequent throat clearing, using albuterol nightly and occasionally during the day, but report persistent wheezing.    They also have a recurrent neck rash, previously managed with Eucrisa, but aggravated by necklaces.    MEDICATIONS  Current: DayQuil, NyQuil, albuterol, Flonase  Past: Mucinex    Current Medicines (including changes today)  Current Outpatient Medications   Medication Sig Dispense Refill    predniSONE (DELTASONE) 20 MG Tab Take 3 Tablets by mouth every day for 3 days, THEN 2 Tablets every day for 2 days, THEN 1 Tablet every day for 2 days. 15 Tablet 0    albuterol 108 (90 Base) MCG/ACT Aero Soln inhalation aerosol Inhale 2 Puffs every 6 hours as needed for Shortness of Breath. 8.5 g 2    clobetasol (TEMOVATE) 0.05 % Cream Apply  topically 2 times a day.      cetirizine (ZYRTEC) 10 MG Tab Take 10 mg by mouth every day.      montelukast (SINGULAIR) 10 MG Tab Take 1 Tablet by mouth every day. 90 Tablet 3    LO LOESTRIN FE 1 MG-10 MCG / 10 MCG Tab       EUCRISA 2 % Ointment       ketoconazole (NIZORAL) 2 % Cream Apply 1 Application topically every day. 30 g 0    hydrocortisone 2.5 % Cream topical cream Apply 1 Application topically 2 times a day. 20 g 0    naproxen (NAPROSYN) 500 MG Tab       fluticasone (FLONASE) 50 MCG/ACT nasal spray       Ibuprofen (ADVIL PO) Take  by mouth.       No current facility-administered medications for this visit.     Past Medical/ Surgical History  She  has a past medical history of Asthma.  She  has a past surgical history that includes dental extraction(s).       Objective:   /84   Pulse 76   Temp 36.5 °C (97.7 °F) (Temporal)   Ht 1.575 m (5' 2\")   Wt 87.5 kg (193 lb)   SpO2 96%  Body mass index is 35.3 kg/m².    Physical Exam  Constitutional:       General: She is not in acute distress.     Appearance: She is not ill-appearing or " toxic-appearing.   HENT:      Head: Normocephalic.      Right Ear: Tympanic membrane and external ear normal.      Left Ear: Tympanic membrane and external ear normal.      Nose: Nose normal. No rhinorrhea.      Right Turbinates: Enlarged and swollen.      Left Turbinates: Enlarged and swollen.      Right Sinus: No maxillary sinus tenderness or frontal sinus tenderness.      Left Sinus: No maxillary sinus tenderness or frontal sinus tenderness.      Mouth/Throat:      Mouth: Mucous membranes are moist.      Pharynx: Oropharynx is clear. Posterior oropharyngeal erythema and postnasal drip present.   Eyes:      General:         Right eye: No discharge.         Left eye: No discharge.      Conjunctiva/sclera: Conjunctivae normal.      Pupils: Pupils are equal, round, and reactive to light.   Cardiovascular:      Rate and Rhythm: Normal rate and regular rhythm.      Heart sounds: No murmur heard.  Pulmonary:      Effort: Pulmonary effort is normal. No respiratory distress.      Breath sounds: Normal breath sounds. No wheezing.   Abdominal:      General: Abdomen is flat.   Musculoskeletal:         General: No swelling or tenderness.      Cervical back: Normal range of motion and neck supple.      Right lower leg: No edema.      Left lower leg: No edema.   Lymphadenopathy:      Cervical: No cervical adenopathy.   Skin:     General: Skin is warm.   Neurological:      General: No focal deficit present.      Mental Status: She is alert and oriented to person, place, and time. Mental status is at baseline.   Psychiatric:         Mood and Affect: Mood normal.          Lab/ Imaging Results:  No labs or imaging to review    Please note that this dictation was created using voice recognition software. I have made every reasonable attempt to correct obvious errors, but I expect that there are errors of grammar and possibly content that I did not discover before finalizing the note.

## 2025-02-10 ENCOUNTER — APPOINTMENT (OUTPATIENT)
Dept: MEDICAL GROUP | Facility: MEDICAL CENTER | Age: 45
End: 2025-02-10
Payer: COMMERCIAL

## 2025-02-24 ENCOUNTER — APPOINTMENT (OUTPATIENT)
Dept: RADIOLOGY | Facility: MEDICAL CENTER | Age: 45
End: 2025-02-24
Attending: FAMILY MEDICINE
Payer: COMMERCIAL

## 2025-02-24 DIAGNOSIS — Z12.31 VISIT FOR SCREENING MAMMOGRAM: ICD-10-CM

## 2025-02-24 PROCEDURE — 77067 SCR MAMMO BI INCL CAD: CPT

## 2025-02-27 ENCOUNTER — RESULTS FOLLOW-UP (OUTPATIENT)
Dept: MEDICAL GROUP | Facility: MEDICAL CENTER | Age: 45
End: 2025-02-27

## 2025-03-08 DIAGNOSIS — Z87.09 HISTORY OF ASTHMA: ICD-10-CM

## 2025-03-10 RX ORDER — ALBUTEROL SULFATE 90 UG/1
2 INHALANT RESPIRATORY (INHALATION) EVERY 6 HOURS PRN
Qty: 8.5 EACH | Refills: 2 | Status: SHIPPED | OUTPATIENT
Start: 2025-03-10

## 2025-03-17 ENCOUNTER — APPOINTMENT (OUTPATIENT)
Dept: MEDICAL GROUP | Facility: MEDICAL CENTER | Age: 45
End: 2025-03-17
Payer: COMMERCIAL

## 2025-04-02 ENCOUNTER — OFFICE VISIT (OUTPATIENT)
Dept: URGENT CARE | Facility: CLINIC | Age: 45
End: 2025-04-02
Payer: COMMERCIAL

## 2025-04-02 VITALS
DIASTOLIC BLOOD PRESSURE: 68 MMHG | HEIGHT: 62 IN | SYSTOLIC BLOOD PRESSURE: 114 MMHG | BODY MASS INDEX: 34.6 KG/M2 | HEART RATE: 80 BPM | WEIGHT: 188 LBS | TEMPERATURE: 98.5 F | OXYGEN SATURATION: 99 %

## 2025-04-02 DIAGNOSIS — J32.9 BACTERIAL SINUSITIS: ICD-10-CM

## 2025-04-02 DIAGNOSIS — B96.89 BACTERIAL SINUSITIS: ICD-10-CM

## 2025-04-02 PROCEDURE — 99213 OFFICE O/P EST LOW 20 MIN: CPT | Performed by: FAMILY MEDICINE

## 2025-04-02 PROCEDURE — 3074F SYST BP LT 130 MM HG: CPT | Performed by: FAMILY MEDICINE

## 2025-04-02 PROCEDURE — 3078F DIAST BP <80 MM HG: CPT | Performed by: FAMILY MEDICINE

## 2025-04-02 ASSESSMENT — FIBROSIS 4 INDEX: FIB4 SCORE: 0.63

## 2025-04-02 NOTE — LETTER
April 2, 2025    To Whom It May Concern:         This is confirmation that Sarah Cardosole Ernesto attended her scheduled appointment with Carmen Josue M.D. on 4/02/25. She may return to work on Friday without any restrictions.          If you have any questions please do not hesitate to call me at the phone number listed below.    Sincerely,          Carmen Josue M.D.  [phone]

## 2025-04-02 NOTE — PROGRESS NOTES
"  Subjective:      44 y.o. female presents to urgent care for cold symptoms that started approximately 1 week ago.  She is experiencing headache, increased sinus pressure, nasal congestion, ear pain, and cough.  No tobacco product use.  She does have asthma for which she uses albuterol as needed.  She has not been using her albuterol anymore since getting sick.  She is vaccinated against COVID.  Several friends/family members have recently been sick with similar symptoms.    She denies any other questions or concerns at this time.    Current problem list, medication, and past medical/surgical history were reviewed in Epic.    ROS  See HPI     Objective:      /68 (BP Location: Left arm, Patient Position: Sitting, BP Cuff Size: Large adult)   Pulse 80   Temp 36.9 °C (98.5 °F) (Temporal)   Ht 1.575 m (5' 2\")   Wt 85.3 kg (188 lb)   SpO2 99%   BMI 34.39 kg/m²     Physical Exam  Constitutional:       General: She is not in acute distress.     Appearance: She is not diaphoretic.   HENT:      Right Ear: Tympanic membrane, ear canal and external ear normal.      Left Ear: Tympanic membrane, ear canal and external ear normal.      Nose:      Right Sinus: Frontal sinus tenderness present. No maxillary sinus tenderness.      Left Sinus: Frontal sinus tenderness present. No maxillary sinus tenderness.      Mouth/Throat:      Tongue: Tongue does not deviate from midline.      Palate: No lesions.      Pharynx: Uvula midline. No oropharyngeal exudate or posterior oropharyngeal erythema.      Tonsils: No tonsillar exudate. 1+ on the right. 1+ on the left.   Cardiovascular:      Rate and Rhythm: Normal rate and regular rhythm.      Heart sounds: Normal heart sounds.   Pulmonary:      Effort: Pulmonary effort is normal. No respiratory distress.      Breath sounds: Normal breath sounds.   Neurological:      Mental Status: She is alert.   Psychiatric:         Mood and Affect: Affect normal.         Judgment: Judgment " normal.       Assessment/Plan:     1. Bacterial sinusitis  Criteria for bacterial sinusitis has been met.  Prescription for Augmentin has been sent.  Tylenol, ibuprofen, Flonase, and a nondrowsy antihistamine as needed.  - amoxicillin-clavulanate (AUGMENTIN) 875-125 MG Tab; Take 1 Tablet by mouth 2 times a day for 5 days.  Dispense: 10 Tablet; Refill: 0      Instructed to return to Urgent Care or nearest Emergency Department if symptoms fail to improve, for any change in condition, further concerns, or new concerning symptoms. Patient states understanding of the plan of care and discharge instructions.    Carmen Josue M.D.

## 2025-04-04 ENCOUNTER — APPOINTMENT (OUTPATIENT)
Dept: MEDICAL GROUP | Facility: MEDICAL CENTER | Age: 45
End: 2025-04-04
Payer: COMMERCIAL

## 2025-04-07 ENCOUNTER — PATIENT MESSAGE (OUTPATIENT)
Dept: MEDICAL GROUP | Facility: MEDICAL CENTER | Age: 45
End: 2025-04-07
Payer: COMMERCIAL

## 2025-04-07 DIAGNOSIS — R05.1 ACUTE COUGH: ICD-10-CM

## 2025-04-07 RX ORDER — BENZONATATE 100 MG/1
100 CAPSULE ORAL 3 TIMES DAILY PRN
Qty: 60 CAPSULE | Refills: 0 | Status: SHIPPED | OUTPATIENT
Start: 2025-04-07

## 2025-04-09 ENCOUNTER — OFFICE VISIT (OUTPATIENT)
Dept: URGENT CARE | Facility: CLINIC | Age: 45
End: 2025-04-09
Payer: COMMERCIAL

## 2025-04-09 ENCOUNTER — APPOINTMENT (OUTPATIENT)
Dept: RADIOLOGY | Facility: IMAGING CENTER | Age: 45
End: 2025-04-09
Attending: PHYSICIAN ASSISTANT
Payer: COMMERCIAL

## 2025-04-09 VITALS
DIASTOLIC BLOOD PRESSURE: 62 MMHG | WEIGHT: 185 LBS | HEIGHT: 63 IN | HEART RATE: 79 BPM | BODY MASS INDEX: 32.78 KG/M2 | RESPIRATION RATE: 16 BRPM | OXYGEN SATURATION: 99 % | TEMPERATURE: 97.5 F | SYSTOLIC BLOOD PRESSURE: 106 MMHG

## 2025-04-09 DIAGNOSIS — Z87.09 HISTORY OF SINUSITIS: ICD-10-CM

## 2025-04-09 DIAGNOSIS — R05.2 SUBACUTE COUGH: ICD-10-CM

## 2025-04-09 DIAGNOSIS — J18.9 ATYPICAL PNEUMONIA: ICD-10-CM

## 2025-04-09 DIAGNOSIS — J45.901 MODERATE ASTHMA WITH EXACERBATION, UNSPECIFIED WHETHER PERSISTENT: ICD-10-CM

## 2025-04-09 DIAGNOSIS — R11.10 POST-TUSSIVE EMESIS: ICD-10-CM

## 2025-04-09 DIAGNOSIS — R05.8 SPASMODIC COUGH: ICD-10-CM

## 2025-04-09 DIAGNOSIS — Z87.898 HISTORY OF POSTNASAL DRIP: ICD-10-CM

## 2025-04-09 PROCEDURE — 71046 X-RAY EXAM CHEST 2 VIEWS: CPT | Mod: TC,FY | Performed by: RADIOLOGY

## 2025-04-09 PROCEDURE — 99214 OFFICE O/P EST MOD 30 MIN: CPT | Performed by: PHYSICIAN ASSISTANT

## 2025-04-09 RX ORDER — BUDESONIDE AND FORMOTEROL FUMARATE DIHYDRATE 80; 4.5 UG/1; UG/1
2 AEROSOL RESPIRATORY (INHALATION) 2 TIMES DAILY
Qty: 10.2 G | Refills: 0 | Status: SHIPPED | OUTPATIENT
Start: 2025-04-09

## 2025-04-09 RX ORDER — AZITHROMYCIN 250 MG/1
TABLET, FILM COATED ORAL
Qty: 6 TABLET | Refills: 0 | Status: SHIPPED | OUTPATIENT
Start: 2025-04-09

## 2025-04-09 ASSESSMENT — FIBROSIS 4 INDEX: FIB4 SCORE: 0.63

## 2025-04-09 NOTE — PROGRESS NOTES
"Subjective:     Verbal consent was acquired by the patient to use [email or web address] ambient listening note generation during this visit     Sarah Orozco is a 44 y.o. female who presents for Cough (With brown/yellow/clear mucus, last night vomited from coughing so hard, wheezing, still feels facial pressure, fatigued and cried from feeling so tired all the time.)       History of Present Illness    Presenting Symptoms  Seen and evaluated last week, treated with Augmentin for sinusitis without improvement in symptoms  persistent cough for approximately 3 weeks, extremely bothersome causing severe fatigue  cough severe enough to induce vomiting  chronic congestion  breathing difficulties  shortness of breath at night, particularly when lying down  lightheadedness  difficulty concentrating at work  lack of energy  occasional dark yellow or brown sputum  occasional blood in nasal mucus when blowing nose in the morning  heartburn triggered by certain foods  nighttime wheezing not alleviated by inhaler  Denies lower extremity swelling  Denies chest pain  History of underlying asthma        Medications:  ADVIL PO  albuterol Aers  benzonatate Caps  cetirizine Tabs  clobetasol Crea  Eucrisa Oint  fluticasone  hydrocortisone Crea  ketoconazole Crea  Lo Loestrin Fe Tabs  montelukast Tabs  naproxen Tabs  SEMAGLUTIDE(0.25 OR 0.5MG/DOS) SC    Allergies:             Patient has no known allergies.    Past Social Hx:  Sarah Orozco  reports that she has never smoked. She has never used smokeless tobacco. She reports current alcohol use. She reports that she does not use drugs.           Problem list, medications, and allergies reviewed by myself today in Epic.     Objective:     /62 (BP Location: Left arm, Patient Position: Sitting, BP Cuff Size: Large adult)   Pulse 79   Temp 36.4 °C (97.5 °F) (Temporal)   Resp 16   Ht 1.588 m (5' 2.5\")   Wt 83.9 kg (185 lb)   SpO2 99%   BMI 33.30 kg/m²     Physical Exam  Vitals and " nursing note reviewed.   Constitutional:       General: She is not in acute distress.     Appearance: She is well-developed. She is not ill-appearing or diaphoretic.   HENT:      Head: Normocephalic.      Right Ear: Tympanic membrane, ear canal and external ear normal.      Left Ear: Tympanic membrane, ear canal and external ear normal.      Nose: Mucosal edema and rhinorrhea present.      Mouth/Throat:      Pharynx: Posterior oropharyngeal erythema present.   Eyes:      General:         Right eye: No discharge.         Left eye: No discharge.      Conjunctiva/sclera: Conjunctivae normal.      Pupils: Pupils are equal, round, and reactive to light.   Cardiovascular:      Rate and Rhythm: Normal rate and regular rhythm.      Pulses: Normal pulses.      Heart sounds: Normal heart sounds. No murmur heard.     No friction rub.   Pulmonary:      Effort: Pulmonary effort is normal. No accessory muscle usage or respiratory distress.      Breath sounds: No stridor. Wheezing present. No rhonchi or rales.      Comments: Mild left-sided basilar rhonchi and expiratory wheeze  Abdominal:      Palpations: Abdomen is soft.   Musculoskeletal:         General: Normal range of motion.      Cervical back: Normal range of motion.      Right lower leg: No edema.      Left lower leg: No edema.   Lymphadenopathy:      Cervical: No cervical adenopathy.   Skin:     General: Skin is warm and dry.   Neurological:      Mental Status: She is alert and oriented to person, place, and time.         Physical Exam            RADIOLOGY RESULTS   DX-CHEST-2 VIEWS  Result Date: 4/9/2025 4/9/2025 8:41 AM HISTORY/REASON FOR EXAM: Cough.  Asthma. TECHNIQUE/EXAM DESCRIPTION AND NUMBER OF VIEWS: Two views of the chest. COMPARISON:  9/26/2023. FINDINGS: Cardiomediastinal contours are normal. No pulmonary consolidation is seen. No bronchial thickening or hyperinflation No pleural space process is evident.     No radiographic evidence of acute cardiopulmonary  process.             Assessment/Plan:     Diagnosis and Associated Orders:     1. History of postnasal drip    2. Spasmodic cough  - DX-CHEST-2 VIEWS  - budesonide-formoterol (SYMBICORT) 80-4.5 MCG/ACT Aerosol; Inhale 2 Puffs 2 times a day.  Dispense: 10.2 g; Refill: 0    3. Post-tussive emesis    4. Subacute cough  - DX-CHEST-2 VIEWS  - azithromycin (ZITHROMAX) 250 MG Tab; Take 2 tablets by mouth on day one. Take one tablet by mouth the remaining days until gone  Dispense: 6 Tablet; Refill: 0    5. History of sinusitis    6. Moderate asthma with exacerbation, unspecified whether persistent  - budesonide-formoterol (SYMBICORT) 80-4.5 MCG/ACT Aerosol; Inhale 2 Puffs 2 times a day.  Dispense: 10.2 g; Refill: 0    7. Atypical pneumonia  - azithromycin (ZITHROMAX) 250 MG Tab; Take 2 tablets by mouth on day one. Take one tablet by mouth the remaining days until gone  Dispense: 6 Tablet; Refill: 0        Medical Decision Making:    Pleasant 44 y.o. presents to clinic with:    Assessment & Plan  Patient presents with 3-week history of cough, recently treated for sinusitis with Augmentin.  Cough is spasmodic in nature.  She did not tolerate oral steroids previously.  She does have a history of underlying asthma.  She has been using her albuterol inhaler regularly.  Her chest x-ray without radiographic abnormality.  Given spasmodic nature of cough, systemic symptoms, will treat for atypical pneumonia with azithromycin.  Will add Symbicort as a suspect her asthma is not well-controlled.  Continue albuterol as needed.  Advise follow-up if symptoms persist or worsen.  Does have element of postnasal drip which may be contributing.  No history of GERD or use of ACE inhibitors.  Differential diagnosis for cough depends on acuity.  For acute cough considerations include bronchitis COPD sinusitis viral respiratory infections allergies, asthma, congestive heart failure, pneumonia, pulmonary embolism.      Subacute cough can be  postinfectious secondary to continued irritation of cough receptors the ongoing resolving bronchial or sinus inflammation from a preceding viral upper respiratory infection.  Chronic cough can be attributed to chronic bronchitis, chronic sinusitis, GERD, interstitial lung diseases, medication side effect, malignancy, sleep apnea, postinfectious cough, psychosomatic cough, upper airway cough syndrome.    I personally reviewed prior external notes and test results pertinent to today's visit. Patient is clinically stable at today's urgent care visit.  Patient appears nontoxic with no acute distress noted. Appropriate for outpatient care at this time.  Return to clinic or go to ED if symptoms worsen or persist.  Red flag symptoms discussed.  Patient/Parent/Guardian voices understanding.   All side effects of medication discussed including allergic response, GI upset, tendon injury, rash, sedation etc    Please note that this dictation was created using voice recognition software. I have made a reasonable attempt to correct obvious errors, but I expect that there are errors of grammar and possibly content that I did not discover before finalizing the note.    This note was electronically signed by Era Shaikh PA-C

## 2025-04-11 ENCOUNTER — APPOINTMENT (OUTPATIENT)
Dept: MEDICAL GROUP | Facility: MEDICAL CENTER | Age: 45
End: 2025-04-11
Payer: COMMERCIAL

## 2025-07-18 ENCOUNTER — APPOINTMENT (OUTPATIENT)
Dept: MEDICAL GROUP | Facility: MEDICAL CENTER | Age: 45
End: 2025-07-18
Payer: COMMERCIAL

## 2025-07-29 ENCOUNTER — OFFICE VISIT (OUTPATIENT)
Dept: MEDICAL GROUP | Facility: MEDICAL CENTER | Age: 45
End: 2025-07-29
Payer: COMMERCIAL

## 2025-07-29 VITALS
HEIGHT: 62 IN | WEIGHT: 185.2 LBS | DIASTOLIC BLOOD PRESSURE: 66 MMHG | TEMPERATURE: 96.9 F | OXYGEN SATURATION: 95 % | SYSTOLIC BLOOD PRESSURE: 120 MMHG | BODY MASS INDEX: 34.08 KG/M2 | HEART RATE: 89 BPM

## 2025-07-29 DIAGNOSIS — K21.9 GASTROESOPHAGEAL REFLUX DISEASE, UNSPECIFIED WHETHER ESOPHAGITIS PRESENT: Primary | ICD-10-CM

## 2025-07-29 RX ORDER — AZELASTINE HYDROCHLORIDE 137 UG/1
1 SPRAY, METERED NASAL DAILY
COMMUNITY
Start: 2025-07-25

## 2025-07-29 RX ORDER — MUPIROCIN 2 %
OINTMENT (GRAM) TOPICAL
COMMUNITY
Start: 2025-07-28

## 2025-07-29 RX ORDER — OMEPRAZOLE 20 MG/1
20 CAPSULE, DELAYED RELEASE ORAL DAILY
Qty: 30 CAPSULE | Refills: 0 | Status: SHIPPED | OUTPATIENT
Start: 2025-07-29

## 2025-07-29 ASSESSMENT — FIBROSIS 4 INDEX: FIB4 SCORE: 0.65

## 2025-07-29 NOTE — PROGRESS NOTES
Verbal consent was acquired by the patient to use [employer] ambient listening note generation during this visit:  Yes      Chief complaint::The encounter diagnosis was Gastroesophageal reflux disease, unspecified whether esophagitis present.    Assessment and Plan:   The following treatment plan was discussed:     Assessment & Plan  1.  GERD: Subacute, unstable.  - Prescribed Prilosec (omeprazole) 20 mg, to be taken 30 minutes before the first meal for 30 days.  - Recommended dietary modifications: reduce acidic foods, spicy foods, carbohydrates, processed foods, sodas, alcohol, and caffeine; eat small portions and avoid late meals.  - Ordered H. pylori breath test, requiring cessation of antacids for 2 weeks before the test.  - Referred to gastroenterology, which can be canceled if symptoms resolve.    2.  Medication question  - On hormonal birth control prescribed by OB/GYN for menstrual cycle management.  - Discussed potential masking of menopausal symptoms by hormonal birth control.  - Advised to monitor symptoms and consider family history when assessing menopausal status.    Follow-up  - Referral to gastroenterology.  Sarah was seen today for heartburn.    Diagnoses and all orders for this visit:    Gastroesophageal reflux disease, unspecified whether esophagitis present  -     Referral to Gastroenterology  -     omeprazole (PRILOSEC) 20 MG delayed-release capsule; Take 1 Capsule by mouth every day.  -     HELICOBACTER PYLORI BREATH TEST; Future        Followup: Return if symptoms worsen or fail to improve.    Subjective/HPI:     HPI:    Sarah Orozco is a pleasant 45 y.o. female here for   Chief Complaint   Patient presents with    Heartburn        History of Present Illness  The patient is a 45-year-old individual presenting with indigestion.    Indigestion  - Persistent heartburn managed with omeprazole for a week, but symptoms persist post-discontinuation.  - Symptoms worsen with tomato-based foods,  "leading them to avoid such foods.  - Despite dietary changes, heartburn continues regardless of food intake.  - Drinks significant amounts of soda but no coffee.  - Eats early and in small portions.    Amenorrhea  - On birth control prescribed by their OB/GYN, resulting in amenorrhea.    Sinus Issues  - Had a balloon procedure for their sinuses after a CT scan by ENT, significantly improving their condition.  - Spouse noted cessation of snoring.  - Sleep has improved with more deep sleep.    Social History:  Diet: Avoids tomato-based foods, eats early and in small portions.  Caffeine: Drinks significant amounts of soda, no coffee.  Sleep: Improved significantly post-balloon procedure, with more deep sleep.    PAST SURGICAL HISTORY:  Balloon procedure for sinuses    FAMILY HISTORY  Parent  at 61.  Two siblings, one older and one younger.    Current Medicines (including changes today)  Current Medications[1]  Past Medical/ Surgical History  She  has a past medical history of Asthma.  She  has a past surgical history that includes dental extraction(s).       Objective:   /66   Pulse 89   Temp 36.1 °C (96.9 °F)   Ht 1.575 m (5' 2\")   Wt 84 kg (185 lb 3.2 oz)   SpO2 95%  Body mass index is 33.87 kg/m².    Physical exam was made by observation   Physical Exam  Constitutional:       General: She is not in acute distress.  HENT:      Head: Normocephalic and atraumatic.   Eyes:      Conjunctiva/sclera: Conjunctivae normal.      Pupils: Pupils are equal, round, and reactive to light.   Pulmonary:      Effort: Pulmonary effort is normal. No respiratory distress.   Abdominal:      General: There is no distension.   Musculoskeletal:      Cervical back: Normal range of motion and neck supple.   Skin:     General: Skin is warm and dry.      Findings: No rash.   Neurological:      Mental Status: She is alert and oriented to person, place, and time.      Gait: Gait is intact.   Psychiatric:         Mood and " Affect: Affect normal.          Lab/ Imaging Results:  No labs or imaging to review    Please note that this dictation was created using voice recognition software. I have made every reasonable attempt to correct obvious errors, but I expect that there are errors of grammar and possibly content that I did not discover before finalizing the note.           [1]   Current Outpatient Medications   Medication Sig Dispense Refill    Azelastine (ASTELIN) 137 MCG/SPRAY Solution Administer 1 Spray into affected nostril(S) every day.      mupirocin (BACTROBAN) 2 % Ointment       omeprazole (PRILOSEC) 20 MG delayed-release capsule Take 1 Capsule by mouth every day. 30 Capsule 0    budesonide-formoterol (SYMBICORT) 80-4.5 MCG/ACT Aerosol Inhale 2 Puffs 2 times a day. 10.2 g 0    azithromycin (ZITHROMAX) 250 MG Tab Take 2 tablets by mouth on day one. Take one tablet by mouth the remaining days until gone 6 Tablet 0    benzonatate (TESSALON) 100 MG Cap Take 1 Capsule by mouth 3 times a day as needed for Cough. 60 Capsule 0    SEMAGLUTIDE,0.25 OR 0.5MG/DOS, SC Inject  under the skin. Gets it from [facility].      albuterol 108 (90 Base) MCG/ACT Aero Soln inhalation aerosol INHALE 2 PUFFS BY MOUTH EVERY 6 HOURS AS NEEDED FOR SHORTNESS OF BREATH 8.5 Each 2    clobetasol (TEMOVATE) 0.05 % Cream Apply  topically 2 times a day.      cetirizine (ZYRTEC) 10 MG Tab Take 10 mg by mouth every day.      montelukast (SINGULAIR) 10 MG Tab Take 1 Tablet by mouth every day. 90 Tablet 3    LO LOESTRIN FE 1 MG-10 MCG / 10 MCG Tab  (Patient not taking: Reported on 4/9/2025)      EUCRISA 2 % Ointment       ketoconazole (NIZORAL) 2 % Cream Apply 1 Application topically every day. 30 g 0    hydrocortisone 2.5 % Cream topical cream Apply 1 Application topically 2 times a day. 20 g 0    naproxen (NAPROSYN) 500 MG Tab  (Patient not taking: Reported on 4/9/2025)      fluticasone (FLONASE) 50 MCG/ACT nasal spray       Ibuprofen (ADVIL PO) Take  by mouth.       No  current facility-administered medications for this visit.

## 2025-08-20 DIAGNOSIS — K21.9 GASTROESOPHAGEAL REFLUX DISEASE, UNSPECIFIED WHETHER ESOPHAGITIS PRESENT: ICD-10-CM

## 2025-08-21 RX ORDER — OMEPRAZOLE 20 MG/1
20 CAPSULE, DELAYED RELEASE ORAL DAILY
Qty: 90 CAPSULE | Refills: 1 | Status: SHIPPED | OUTPATIENT
Start: 2025-08-21

## 2025-08-28 ENCOUNTER — APPOINTMENT (OUTPATIENT)
Dept: MEDICAL GROUP | Facility: MEDICAL CENTER | Age: 45
End: 2025-08-28
Payer: COMMERCIAL